# Patient Record
Sex: FEMALE | Race: BLACK OR AFRICAN AMERICAN | Employment: UNEMPLOYED | URBAN - METROPOLITAN AREA
[De-identification: names, ages, dates, MRNs, and addresses within clinical notes are randomized per-mention and may not be internally consistent; named-entity substitution may affect disease eponyms.]

---

## 2019-04-01 ENCOUNTER — HOSPITAL ENCOUNTER (EMERGENCY)
Age: 24
Discharge: OTHER INSTITUTION | End: 2019-04-01
Attending: EMERGENCY MEDICINE
Payer: MEDICAID

## 2019-04-01 VITALS
WEIGHT: 190 LBS | SYSTOLIC BLOOD PRESSURE: 100 MMHG | RESPIRATION RATE: 20 BRPM | TEMPERATURE: 97.8 F | DIASTOLIC BLOOD PRESSURE: 46 MMHG | HEIGHT: 65 IN | BODY MASS INDEX: 31.65 KG/M2 | HEART RATE: 68 BPM | OXYGEN SATURATION: 100 %

## 2019-04-01 DIAGNOSIS — R45.851 SUICIDAL IDEATIONS: ICD-10-CM

## 2019-04-01 DIAGNOSIS — F32.A DEPRESSION, UNSPECIFIED DEPRESSION TYPE: Primary | ICD-10-CM

## 2019-04-01 LAB
ACETAMINOPHEN LEVEL: <5 MCG/ML (ref 10–30)
ALBUMIN SERPL-MCNC: 3.9 G/DL (ref 3.5–5.2)
ALP BLD-CCNC: 70 U/L (ref 35–104)
ALT SERPL-CCNC: 8 U/L (ref 0–32)
AMPHETAMINE SCREEN, URINE: NOT DETECTED
ANION GAP SERPL CALCULATED.3IONS-SCNC: 12 MMOL/L (ref 7–16)
AST SERPL-CCNC: 13 U/L (ref 0–31)
BACTERIA: ABNORMAL /HPF
BARBITURATE SCREEN URINE: NOT DETECTED
BASOPHILS ABSOLUTE: 0.03 E9/L (ref 0–0.2)
BASOPHILS RELATIVE PERCENT: 0.5 % (ref 0–2)
BENZODIAZEPINE SCREEN, URINE: NOT DETECTED
BILIRUB SERPL-MCNC: 0.5 MG/DL (ref 0–1.2)
BILIRUBIN URINE: NEGATIVE
BLOOD, URINE: ABNORMAL
BUN BLDV-MCNC: 10 MG/DL (ref 6–20)
CALCIUM SERPL-MCNC: 8.9 MG/DL (ref 8.6–10.2)
CANNABINOID SCREEN URINE: POSITIVE
CHLORIDE BLD-SCNC: 104 MMOL/L (ref 98–107)
CLARITY: CLEAR
CO2: 23 MMOL/L (ref 22–29)
COCAINE METABOLITE SCREEN URINE: NOT DETECTED
COLOR: YELLOW
CREAT SERPL-MCNC: 0.8 MG/DL (ref 0.5–1)
EKG ATRIAL RATE: 77 BPM
EKG P AXIS: 59 DEGREES
EKG P-R INTERVAL: 172 MS
EKG Q-T INTERVAL: 386 MS
EKG QRS DURATION: 80 MS
EKG QTC CALCULATION (BAZETT): 436 MS
EKG R AXIS: 78 DEGREES
EKG T AXIS: 42 DEGREES
EKG VENTRICULAR RATE: 77 BPM
EOSINOPHILS ABSOLUTE: 0.11 E9/L (ref 0.05–0.5)
EOSINOPHILS RELATIVE PERCENT: 1.8 % (ref 0–6)
EPITHELIAL CELLS, UA: ABNORMAL /HPF
ETHANOL: <10 MG/DL (ref 0–0.08)
GFR AFRICAN AMERICAN: >60
GFR NON-AFRICAN AMERICAN: >60 ML/MIN/1.73
GLUCOSE BLD-MCNC: 101 MG/DL (ref 74–99)
GLUCOSE URINE: NEGATIVE MG/DL
HCG, URINE, POC: NEGATIVE
HCT VFR BLD CALC: 40.7 % (ref 34–48)
HEMOGLOBIN: 12.7 G/DL (ref 11.5–15.5)
IMMATURE GRANULOCYTES #: 0.02 E9/L
IMMATURE GRANULOCYTES %: 0.3 % (ref 0–5)
KETONES, URINE: NEGATIVE MG/DL
LEUKOCYTE ESTERASE, URINE: ABNORMAL
LYMPHOCYTES ABSOLUTE: 2.62 E9/L (ref 1.5–4)
LYMPHOCYTES RELATIVE PERCENT: 43.8 % (ref 20–42)
Lab: NORMAL
MCH RBC QN AUTO: 29.2 PG (ref 26–35)
MCHC RBC AUTO-ENTMCNC: 31.2 % (ref 32–34.5)
MCV RBC AUTO: 93.6 FL (ref 80–99.9)
METHADONE SCREEN, URINE: NOT DETECTED
MONOCYTES ABSOLUTE: 0.81 E9/L (ref 0.1–0.95)
MONOCYTES RELATIVE PERCENT: 13.5 % (ref 2–12)
NEGATIVE QC PASS/FAIL: NORMAL
NEUTROPHILS ABSOLUTE: 2.39 E9/L (ref 1.8–7.3)
NEUTROPHILS RELATIVE PERCENT: 40.1 % (ref 43–80)
NITRITE, URINE: NEGATIVE
OPIATE SCREEN URINE: NOT DETECTED
PDW BLD-RTO: 12.3 FL (ref 11.5–15)
PH UA: 6 (ref 5–9)
PHENCYCLIDINE SCREEN URINE: NOT DETECTED
PLATELET # BLD: 245 E9/L (ref 130–450)
PMV BLD AUTO: 11.3 FL (ref 7–12)
POSITIVE QC PASS/FAIL: NORMAL
POTASSIUM SERPL-SCNC: 3.9 MMOL/L (ref 3.5–5)
PROPOXYPHENE SCREEN: NOT DETECTED
PROTEIN UA: ABNORMAL MG/DL
RBC # BLD: 4.35 E12/L (ref 3.5–5.5)
RBC UA: ABNORMAL /HPF (ref 0–2)
SALICYLATE, SERUM: <0.3 MG/DL (ref 0–30)
SODIUM BLD-SCNC: 139 MMOL/L (ref 132–146)
SPECIFIC GRAVITY UA: 1.02 (ref 1–1.03)
TOTAL PROTEIN: 6.7 G/DL (ref 6.4–8.3)
TRICYCLIC ANTIDEPRESSANTS SCREEN SERUM: NEGATIVE NG/ML
TSH SERPL DL<=0.05 MIU/L-ACNC: 2.85 UIU/ML (ref 0.27–4.2)
UROBILINOGEN, URINE: 1 E.U./DL
WBC # BLD: 6 E9/L (ref 4.5–11.5)
WBC UA: ABNORMAL /HPF (ref 0–5)

## 2019-04-01 PROCEDURE — 80307 DRUG TEST PRSMV CHEM ANLYZR: CPT

## 2019-04-01 PROCEDURE — 93005 ELECTROCARDIOGRAM TRACING: CPT | Performed by: EMERGENCY MEDICINE

## 2019-04-01 PROCEDURE — G0480 DRUG TEST DEF 1-7 CLASSES: HCPCS

## 2019-04-01 PROCEDURE — 80053 COMPREHEN METABOLIC PANEL: CPT

## 2019-04-01 PROCEDURE — 84443 ASSAY THYROID STIM HORMONE: CPT

## 2019-04-01 PROCEDURE — 85025 COMPLETE CBC W/AUTO DIFF WBC: CPT

## 2019-04-01 PROCEDURE — 36415 COLL VENOUS BLD VENIPUNCTURE: CPT

## 2019-04-01 PROCEDURE — 81001 URINALYSIS AUTO W/SCOPE: CPT

## 2019-04-01 PROCEDURE — 99285 EMERGENCY DEPT VISIT HI MDM: CPT

## 2019-04-01 PROCEDURE — 6370000000 HC RX 637 (ALT 250 FOR IP): Performed by: NURSE PRACTITIONER

## 2019-04-01 RX ORDER — LORAZEPAM 1 MG/1
1 TABLET ORAL ONCE
Status: COMPLETED | OUTPATIENT
Start: 2019-04-01 | End: 2019-04-01

## 2019-04-01 RX ADMIN — LORAZEPAM 1 MG: 1 TABLET ORAL at 03:16

## 2019-04-01 ASSESSMENT — PATIENT HEALTH QUESTIONNAIRE - PHQ9: SUM OF ALL RESPONSES TO PHQ QUESTIONS 1-9: 15

## 2019-04-01 NOTE — ED NOTES
2 belongings bags with patient belongings provided to EMS. Patient acknowledges all belongings.      Chivo Mcfarland RN  04/01/19 [id number]

## 2019-04-01 NOTE — ED NOTES
MICHAEL PLACED PHONE CALL TO [employer] [phone] AND SPOKE WITH REP. FLORES.    [facility] AMBULANCE ETA 2:45 PM    AMBULANCE PACKET COMPLETE.     AUTHORIZATION #[id number]

## 2019-04-01 NOTE — ED NOTES
Completed patient assessment. Patient admits to suicidal ideation - admits to thoughts of wanting to OD. Patient denies HI. Patient is a 27year old, female presenting to ED for suicidal ideation. Patient reports that her mother called the PD because pt locked herself in her room and pt mother was concerned that she would attempt suicide. Patient reports that she has been feeling this way for a year with no known trigger - patient did report to the CNP that she recently lost her job and had to move back in with her mother. Patient reports a mental health hx of depression & anxiety, not actively in mental health treatment, and pt denies hx of psychiatric hospitalization. 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 Patient reports that she \"trys not to sleep\". Patient reports poor appetite. Patient reports increased feelings of hopelessness/helplessness. Patient admits to hx of suicide attempts - most recent in July 2018 for an OD. Patient reports hx of self injurious behaviors - last cut at the age of 17. Patient admits to occasional marijuana use. Patient cooperative, oriented x 4, depressed mood, flat affect, clear thought process/speech pattern. Patient admits to auditory hallucinations - unable to verbalize what she experiences. Patient denies visual hallucinations. Patient was pink slipped for safety. SW will pursue inpatient admission for safety/stabilization.

## 2019-04-01 NOTE — ED NOTES
RECEIVED PHONE CALL FROM [facility] . Katarina Eden. PATIENT IS ACCEPTED TO [facility] BY DR. Nasrin Waters. PATIENT TO GO TO THE ADULT [facility]    N2N: [phone]    SW TO ARRANGE TRANSPORT.

## 2019-04-01 NOTE — ED PROVIDER NOTES
home medications have been reviewed. Allergies: Patient has no known allergies. -------------------------------------------------- RESULTS -------------------------------------------------  All laboratory and imaging studies were reviewed by myself.     LABS:  Results for orders placed or performed during the hospital encounter of 04/01/19   Urine Drug Screen   Result Value Ref Range    Amphetamine Screen, Urine NOT DETECTED Negative <1000 ng/mL    Barbiturate Screen, Ur NOT DETECTED Negative < 200 ng/mL    Benzodiazepine Screen, Urine NOT DETECTED Negative < 200 ng/mL    Cannabinoid Scrn, Ur POSITIVE (A) Negative < 50ng/mL    Cocaine Metabolite Screen, Urine NOT DETECTED Negative < 300 ng/mL    Opiate Scrn, Ur NOT DETECTED Negative < 300ng/mL    PCP Screen, Urine NOT DETECTED Negative < 25 ng/mL    Methadone Screen, Urine NOT DETECTED Negative <300 ng/mL    Propoxyphene Scrn, Ur NOT DETECTED Negative <300 ng/mL   Serum Drug Screen   Result Value Ref Range    Ethanol Lvl <10 mg/dL    Acetaminophen Level <5.0 (L) 10.0 - 26.5 mcg/mL    Salicylate, Serum <0.6 0.0 - 30.0 mg/dL    TCA Scrn NEGATIVE Cutoff:300 ng/mL   CBC Auto Differential   Result Value Ref Range    WBC 6.0 4.5 - 11.5 E9/L    RBC 4.35 3.50 - 5.50 E12/L    Hemoglobin 12.7 11.5 - 15.5 g/dL    Hematocrit 40.7 34.0 - 48.0 %    MCV 93.6 80.0 - 99.9 fL    MCH 29.2 26.0 - 35.0 pg    MCHC 31.2 (L) 32.0 - 34.5 %    RDW 12.3 11.5 - 15.0 fL    Platelets 850 258 - 007 E9/L    MPV 11.3 7.0 - 12.0 fL    Neutrophils % 40.1 (L) 43.0 - 80.0 %    Immature Granulocytes % 0.3 0.0 - 5.0 %    Lymphocytes % 43.8 (H) 20.0 - 42.0 %    Monocytes % 13.5 (H) 2.0 - 12.0 %    Eosinophils % 1.8 0.0 - 6.0 %    Basophils % 0.5 0.0 - 2.0 %    Neutrophils # 2.39 1.80 - 7.30 E9/L    Immature Granulocytes # 0.02 E9/L    Lymphocytes # 2.62 1.50 - 4.00 E9/L    Monocytes # 0.81 0.10 - 0.95 E9/L    Eosinophils # 0.11 0.05 - 0.50 E9/L    Basophils # 0.03 0.00 - 0.20 E9/L   Comprehensive ---------------------------------------------------PHYSICAL EXAM--------------------------------------      Constitutional/General: Alert and oriented x3, well appearing, non toxic in NAD  Head: Normocephalic, atraumatic  Eyes: PERRL, EOMI  Mouth: Oropharynx clear, handling secretions, no trismus  Neck: Supple, full ROM, non tender to palpation in the midline, no stridor, no crepitus, no meningeal signs  Pulmonary: Lungs clear to auscultation bilaterally, no wheezes, rales, or rhonchi. Not in respiratory distress  Cardiovascular:  Regular rate and rhythm, no murmurs, gallops, or rubs. 2+ distal pulses  Abdomen: Soft, non tender, non distended, +BS, no rebound, guarding, or rigidity. No pulsatile masses appreciated  Extremities: Moves all extremities x 4. Warm and well perfused, no clubbing, cyanosis, or edema. Capillary refill <3 seconds  Skin: warm and dry without rash  Neurologic: GCS 15, CN 2-12 grossly intact, no focal deficits, symmetric strength 5/5 in the upper and lower extremities bilaterally  Psych: Depressed Affect,Patient presents today with worsening depression and active suicidal thoughts. Patient reports that she has felt this way for over one year. She reports that she overdosed on meds in July but never was seen for this and does not have a counselor or psychiatrist and does not take any medications. Patient reports that she recently lost her job due to her depression and patient just moved back home with her mother to live. Patient crying on my assessment and patient just stating that she no longer once to live anymore. Patient will not state reasoning why just reports multiple stressors. Patient with very flat affect. She denies drug or alcohol abuse, she also denies any homicidal ideations or hallucinations. Patient reports plan would be any way she can even by medications or drugs.       ------------------------------------------ PROGRESS NOTES ------------------------------------------     Medical decision making:  Plan will be to obtain labs will consult . Patient is a pink slip. Suicide precautions initiated. Labs resulted urinalysis with trace leukocytes, urine pregnancy negative, CBC negative, urine drug screen positive for THC, serum drug screen negative, chemistry panel negative, TSH normal.  patient is medically cleared. Patient is pink slipped. Awaiting  evaluation    Consultations:   Social work     Re-Evaluations:        Counseling: The emergency provider has spoken with thepatient and discussed todays results, in addition to providing specific details for the plan of care and counseling regarding the diagnosis and prognosis. Questions are answered at this time and they are agreeable with the plan.         --------------------------------- IMPRESSION AND DISPOSITION ---------------------------------    IMPRESSION  1. Depression, unspecified depression type    2.  Suicidal ideations        DISPOSITION  Disposition: as per consultation   Patient condition is stable           HARPAL Voss CNP  04/01/19 827 [facility], APRN - CNP  04/01/19 [id number]

## 2019-04-06 LAB — CANNABINOIDS CONF, URINE: >500 NG/ML

## 2020-06-11 ENCOUNTER — HOSPITAL ENCOUNTER (OUTPATIENT)
Age: 25
Discharge: HOME | End: 2020-06-11
Payer: MEDICAID

## 2020-06-11 LAB
Lab: NORMAL
REPORT: NORMAL
THIS TEST SENT TO: NORMAL

## 2020-09-25 ENCOUNTER — HOSPITAL ENCOUNTER (OUTPATIENT)
Age: 25
Discharge: HOME | End: 2020-09-25
Attending: OBSTETRICS & GYNECOLOGY | Admitting: OBSTETRICS & GYNECOLOGY
Payer: MEDICAID

## 2020-09-25 VITALS
RESPIRATION RATE: 16 BRPM | HEART RATE: 102 BPM | HEIGHT: 65 IN | BODY MASS INDEX: 32.99 KG/M2 | SYSTOLIC BLOOD PRESSURE: 117 MMHG | TEMPERATURE: 98.5 F | WEIGHT: 198 LBS | DIASTOLIC BLOOD PRESSURE: 73 MMHG

## 2020-09-25 PROBLEM — O16.3 ELEVATED BLOOD PRESSURE AFFECTING PREGNANCY IN THIRD TRIMESTER, ANTEPARTUM: Status: ACTIVE | Noted: 2020-09-25

## 2020-09-25 LAB
ALBUMIN SERPL-MCNC: 3.3 G/DL (ref 3.5–5.2)
ALP BLD-CCNC: 204 U/L (ref 35–104)
ALT SERPL-CCNC: 18 U/L (ref 0–32)
ANION GAP SERPL CALCULATED.3IONS-SCNC: 9 MMOL/L (ref 7–16)
APTT: 26.9 SEC (ref 24.5–35.1)
AST SERPL-CCNC: 21 U/L (ref 0–31)
BACTERIA: ABNORMAL /HPF
BASOPHILS ABSOLUTE: 0.02 E9/L (ref 0–0.2)
BASOPHILS RELATIVE PERCENT: 0.2 % (ref 0–2)
BILIRUB SERPL-MCNC: 0.5 MG/DL (ref 0–1.2)
BILIRUBIN URINE: NEGATIVE
BLOOD, URINE: NEGATIVE
BUN BLDV-MCNC: 4 MG/DL (ref 6–20)
CALCIUM SERPL-MCNC: 8.9 MG/DL (ref 8.6–10.2)
CHLORIDE BLD-SCNC: 104 MMOL/L (ref 98–107)
CLARITY: CLEAR
CO2: 20 MMOL/L (ref 22–29)
COLOR: YELLOW
CREAT SERPL-MCNC: 0.6 MG/DL (ref 0.5–1)
EOSINOPHILS ABSOLUTE: 0.13 E9/L (ref 0.05–0.5)
EOSINOPHILS RELATIVE PERCENT: 1.4 % (ref 0–6)
EPITHELIAL CELLS, UA: ABNORMAL /HPF
FIBRINOGEN: 606 MG/DL (ref 225–540)
GFR AFRICAN AMERICAN: >60
GFR NON-AFRICAN AMERICAN: >60 ML/MIN/1.73
GLUCOSE BLD-MCNC: 98 MG/DL (ref 74–99)
GLUCOSE URINE: NEGATIVE MG/DL
HCT VFR BLD CALC: 34.3 % (ref 34–48)
HEMOGLOBIN: 10.9 G/DL (ref 11.5–15.5)
IMMATURE GRANULOCYTES #: 0.11 E9/L
IMMATURE GRANULOCYTES %: 1.2 % (ref 0–5)
INR BLD: 0.9
KETONES, URINE: NEGATIVE MG/DL
LEUKOCYTE ESTERASE, URINE: ABNORMAL
LYMPHOCYTES ABSOLUTE: 1.75 E9/L (ref 1.5–4)
LYMPHOCYTES RELATIVE PERCENT: 19.4 % (ref 20–42)
MCH RBC QN AUTO: 29.5 PG (ref 26–35)
MCHC RBC AUTO-ENTMCNC: 31.8 % (ref 32–34.5)
MCV RBC AUTO: 93 FL (ref 80–99.9)
MONOCYTES ABSOLUTE: 1.13 E9/L (ref 0.1–0.95)
MONOCYTES RELATIVE PERCENT: 12.6 % (ref 2–12)
NEUTROPHILS ABSOLUTE: 5.86 E9/L (ref 1.8–7.3)
NEUTROPHILS RELATIVE PERCENT: 65.2 % (ref 43–80)
NITRITE, URINE: NEGATIVE
PDW BLD-RTO: 13.2 FL (ref 11.5–15)
PH UA: 7 (ref 5–9)
PLATELET # BLD: 149 E9/L (ref 130–450)
PMV BLD AUTO: 13.3 FL (ref 7–12)
POTASSIUM SERPL-SCNC: 3.9 MMOL/L (ref 3.5–5)
PROTEIN UA: NEGATIVE MG/DL
PROTHROMBIN TIME: 10.7 SEC (ref 9.3–12.4)
RBC # BLD: 3.69 E12/L (ref 3.5–5.5)
RBC UA: ABNORMAL /HPF (ref 0–2)
SODIUM BLD-SCNC: 133 MMOL/L (ref 132–146)
SPECIFIC GRAVITY UA: <=1.005 (ref 1–1.03)
TOTAL PROTEIN: 6.1 G/DL (ref 6.4–8.3)
URIC ACID, SERUM: 5.6 MG/DL (ref 2.4–5.7)
UROBILINOGEN, URINE: 0.2 E.U./DL
WBC # BLD: 9 E9/L (ref 4.5–11.5)
WBC UA: ABNORMAL /HPF (ref 0–5)

## 2020-09-25 PROCEDURE — 80053 COMPREHEN METABOLIC PANEL: CPT

## 2020-09-25 PROCEDURE — 85610 PROTHROMBIN TIME: CPT

## 2020-09-25 PROCEDURE — 85384 FIBRINOGEN ACTIVITY: CPT

## 2020-09-25 PROCEDURE — 99214 OFFICE O/P EST MOD 30 MIN: CPT | Performed by: MIDWIFE

## 2020-09-25 PROCEDURE — 84550 ASSAY OF BLOOD/URIC ACID: CPT

## 2020-09-25 PROCEDURE — 85730 THROMBOPLASTIN TIME PARTIAL: CPT

## 2020-09-25 PROCEDURE — 81001 URINALYSIS AUTO W/SCOPE: CPT

## 2020-09-25 PROCEDURE — 36415 COLL VENOUS BLD VENIPUNCTURE: CPT

## 2020-09-25 PROCEDURE — 85025 COMPLETE CBC W/AUTO DIFF WBC: CPT

## 2020-09-25 PROCEDURE — 99202 OFFICE O/P NEW SF 15 MIN: CPT

## 2020-09-25 NOTE — PROGRESS NOTES
presents to L&D from Dr. Alva Colon office with a written script for [facility] labs and BP monitoring. Patient denies VB, and LOF. +FM. Patient states that Dr. Scotty Gordon said she is having ctx. Patient is on EFM in RR2 and VSS.

## 2020-09-25 NOTE — H&P
Department of Obstetrics and Gynecology   Obstetrics History and Physical      CHIEF COMPLAINT:  elevated blood pressure    HISTORY OF PRESENT ILLNESS:      The patient is a 23 y.o.  at 39 weeks 6 days  Patient presents with a chief complaint as above and is being admitted from office for elevated BP. Sent with orders for [facility] labs. Pt denies H/A , visual disturbances or epigastric pain at this time. Pt states she was mukesh last evening, NO CTX/LOF/VB at this time. Reports + FM. Denies complications with pregnancy. PT states she had a previous history of HTN resulting from family issues, that was not treated with medications and resolved. Admits to [facility] use during pregnancy    DATES:    No LMP recorded. Patient is pregnant. Estimated Date of Delivery: 20    PRENATAL CARE:    Provider:  Dalila Desir         PAST OB HISTORY        Depression:  Yes       Post-partum depression:  No      Diabetes:  No      Gestational Diabetes:  No      Thyroid Disease:  No      Chronic HTN:  No      Gestation HTN:  No      Pre-eclampsia:  No      Seizure disorder:  No      Asthma:  No      Clotting disorder:  No      :  No      Tubal ligation:  No      D & C:  No      Cerclage:  No      LEEP:  No      Myomectomy:  No    OB History    Para Term  AB Living   1             SAB TAB Ectopic Molar Multiple Live Births                    # Outcome Date GA Lbr Paul/2nd Weight Sex Delivery Anes PTL Lv   1 Current                    Past Medical History:        Diagnosis Date    Depression     Hypertension      Past Surgical History:    History reviewed. No pertinent surgical history. Social History:    Denies smoking, drugs or alcohol use. Admits to [facility] use during pregnancy  Family History:   History reviewed. No pertinent family history.   Medications Prior to Admission:  Medications Prior to Admission: Prenatal Multivit-Min-Fe-FA (PRE-TAMIA PO), Take by mouth  vitamin D (ERGOCALCIFEROL) 88143 UNITS CAPS capsule, Take 1 capsule by mouth once a week for 8 doses. sertraline (ZOLOFT) 50 MG tablet, Take 50 mg by mouth daily. QUEtiapine (SEROQUEL) 100 MG tablet, Take 100 mg by mouth daily. Allergies:  Bee venom        PHYSICAL EXAM:    VITALS:  /82   Pulse 102   Temp 98.5 °F (36.9 °C) (Oral)   Resp 16   Ht 5' 5\" (1.651 m)   Wt 198 lb (89.8 kg)   BMI 32.95 kg/m²       General appearance:  awake, alert, cooperative, no apparent distress, and appears stated age  Neurologic:  Awake, alert, oriented to name, place and time.     Lungs:  No increased work of breathing, good air exchange, clear to auscultation bilaterally, no crackles or wheezing  Heart:  Normal apical impulse, regular rate and rhythm,  Abdomen:  Gravid, soft, nontender  Fetal heart rate:  Baseline Heart Rate 135, accelerations:  present  long term variability:  moderate  decelerations:  absent  Pelvis:  Deferred  Contraction frequency:  0 minutes  Membranes:  Intact      ASSESSMENT AND PLAN:  PT sent with orders from Dr. Beau Jones office  [facility] labs  [facility]    [facility]

## 2020-10-02 ENCOUNTER — HOSPITAL ENCOUNTER (INPATIENT)
Age: 25
LOS: 4 days | Discharge: HOME | DRG: 540 | End: 2020-10-06
Attending: OBSTETRICS & GYNECOLOGY | Admitting: OBSTETRICS & GYNECOLOGY
Payer: MEDICAID

## 2020-10-02 ENCOUNTER — ANESTHESIA EVENT (OUTPATIENT)
Dept: LABOR AND DELIVERY | Age: 25
DRG: 540 | End: 2020-10-02
Payer: MEDICAID

## 2020-10-02 ENCOUNTER — ANESTHESIA (OUTPATIENT)
Dept: LABOR AND DELIVERY | Age: 25
DRG: 540 | End: 2020-10-02
Payer: MEDICAID

## 2020-10-02 PROBLEM — Z34.90 NORMAL PREGNANCY, ANTEPARTUM: Status: ACTIVE | Noted: 2020-10-02

## 2020-10-02 LAB
ABO/RH: NORMAL
AMPHETAMINE SCREEN, URINE: NOT DETECTED
ANTIBODY SCREEN: NORMAL
BARBITURATE SCREEN URINE: NOT DETECTED
BENZODIAZEPINE SCREEN, URINE: NOT DETECTED
CANNABINOID SCREEN URINE: POSITIVE
COCAINE METABOLITE SCREEN URINE: NOT DETECTED
FENTANYL SCREEN, URINE: NOT DETECTED
HCT VFR BLD CALC: 35.5 % (ref 34–48)
HEMOGLOBIN: 11.3 G/DL (ref 11.5–15.5)
Lab: ABNORMAL
MCH RBC QN AUTO: 29.2 PG (ref 26–35)
MCHC RBC AUTO-ENTMCNC: 31.8 % (ref 32–34.5)
MCV RBC AUTO: 91.7 FL (ref 80–99.9)
METHADONE SCREEN, URINE: NOT DETECTED
OPIATE SCREEN URINE: NOT DETECTED
OXYCODONE URINE: NOT DETECTED
PDW BLD-RTO: 13.7 FL (ref 11.5–15)
PHENCYCLIDINE SCREEN URINE: NOT DETECTED
PLATELET # BLD: 175 E9/L (ref 130–450)
PMV BLD AUTO: 13.8 FL (ref 7–12)
RBC # BLD: 3.87 E12/L (ref 3.5–5.5)
WBC # BLD: 10.5 E9/L (ref 4.5–11.5)

## 2020-10-02 PROCEDURE — 4A1HXCZ MONITORING OF PRODUCTS OF CONCEPTION, CARDIAC RATE, EXTERNAL APPROACH: ICD-10-PCS | Performed by: OBSTETRICS & GYNECOLOGY

## 2020-10-02 PROCEDURE — 85027 COMPLETE CBC AUTOMATED: CPT

## 2020-10-02 PROCEDURE — 6360000002 HC RX W HCPCS: Performed by: OBSTETRICS & GYNECOLOGY

## 2020-10-02 PROCEDURE — 36415 COLL VENOUS BLD VENIPUNCTURE: CPT

## 2020-10-02 PROCEDURE — 1220000001 HC SEMI PRIVATE L&D R&B

## 2020-10-02 PROCEDURE — G0480 DRUG TEST DEF 1-7 CLASSES: HCPCS

## 2020-10-02 PROCEDURE — 86900 BLOOD TYPING SEROLOGIC ABO: CPT

## 2020-10-02 PROCEDURE — 86901 BLOOD TYPING SEROLOGIC RH(D): CPT

## 2020-10-02 PROCEDURE — 86850 RBC ANTIBODY SCREEN: CPT

## 2020-10-02 PROCEDURE — 2580000003 HC RX 258: Performed by: OBSTETRICS & GYNECOLOGY

## 2020-10-02 PROCEDURE — 99233 SBSQ HOSP IP/OBS HIGH 50: CPT | Performed by: PHYSICIAN ASSISTANT

## 2020-10-02 PROCEDURE — 80307 DRUG TEST PRSMV CHEM ANLYZR: CPT

## 2020-10-02 RX ORDER — NALOXONE HYDROCHLORIDE 0.4 MG/ML
0.4 INJECTION, SOLUTION INTRAMUSCULAR; INTRAVENOUS; SUBCUTANEOUS PRN
Status: DISCONTINUED | OUTPATIENT
Start: 2020-10-02 | End: 2020-10-03

## 2020-10-02 RX ORDER — ONDANSETRON 2 MG/ML
4 INJECTION INTRAMUSCULAR; INTRAVENOUS EVERY 6 HOURS PRN
Status: DISCONTINUED | OUTPATIENT
Start: 2020-10-02 | End: 2020-10-03

## 2020-10-02 RX ORDER — LIDOCAINE HYDROCHLORIDE 10 MG/ML
INJECTION, SOLUTION INFILTRATION; PERINEURAL
Status: DISPENSED
Start: 2020-10-02 | End: 2020-10-03

## 2020-10-02 RX ORDER — NALBUPHINE HCL 10 MG/ML
5 AMPUL (ML) INJECTION EVERY 4 HOURS PRN
Status: DISCONTINUED | OUTPATIENT
Start: 2020-10-02 | End: 2020-10-02 | Stop reason: RX

## 2020-10-02 RX ORDER — SODIUM CHLORIDE, SODIUM LACTATE, POTASSIUM CHLORIDE, CALCIUM CHLORIDE 600; 310; 30; 20 MG/100ML; MG/100ML; MG/100ML; MG/100ML
INJECTION, SOLUTION INTRAVENOUS CONTINUOUS
Status: DISCONTINUED | OUTPATIENT
Start: 2020-10-02 | End: 2020-10-06 | Stop reason: HOSPADM

## 2020-10-02 RX ORDER — ACETAMINOPHEN 650 MG
TABLET, EXTENDED RELEASE ORAL
Status: DISPENSED
Start: 2020-10-02 | End: 2020-10-03

## 2020-10-02 RX ORDER — EPHEDRINE SULFATE 50 MG/ML
5 INJECTION, SOLUTION INTRAVENOUS PRN
Status: DISCONTINUED | OUTPATIENT
Start: 2020-10-02 | End: 2020-10-03

## 2020-10-02 RX ADMIN — SODIUM CHLORIDE, POTASSIUM CHLORIDE, SODIUM LACTATE AND CALCIUM CHLORIDE: 600; 310; 30; 20 INJECTION, SOLUTION INTRAVENOUS at 23:15

## 2020-10-02 RX ADMIN — BUTORPHANOL TARTRATE 1 MG: 2 INJECTION, SOLUTION INTRAMUSCULAR; INTRAVENOUS at 19:13

## 2020-10-02 RX ADMIN — BUTORPHANOL TARTRATE 1 MG: 2 INJECTION, SOLUTION INTRAMUSCULAR; INTRAVENOUS at 19:12

## 2020-10-02 RX ADMIN — SODIUM CHLORIDE, POTASSIUM CHLORIDE, SODIUM LACTATE AND CALCIUM CHLORIDE: 600; 310; 30; 20 INJECTION, SOLUTION INTRAVENOUS at 17:40

## 2020-10-02 ASSESSMENT — PAIN SCALES - GENERAL
PAINLEVEL_OUTOF10: 5
PAINLEVEL_OUTOF10: 5

## 2020-10-02 ASSESSMENT — PAIN DESCRIPTION - DESCRIPTORS: DESCRIPTORS: CRAMPING

## 2020-10-02 ASSESSMENT — LIFESTYLE VARIABLES: SMOKING_STATUS: 1

## 2020-10-02 NOTE — PROGRESS NOTES
40w6d presents to labor and delivery from Dr Dago Bauer office for rule out labor. Pt states contractions started at 0930 this morning. Denies any leaking of fluid or bleeding and states good fetal movement. Placed on EFM. Dr Cyrus Mathew at nurses station informed of pt's arrival, states pt was 2cm in the office today, monitor pt for 2 hours, recheck cervix and update.

## 2020-10-02 NOTE — PROGRESS NOTES
Dr joshi paged and returned call. Informed of AROM, SVE, fhr tracing/ctx pattern, and pt did refuse IUPC placement. No new orders.

## 2020-10-02 NOTE — H&P
Department of Obstetrics and Gynecology  Physician Assistant Obstetrics History and Physical      HISTORY OF PRESENT ILLNESS:      The patient is a 23 y.o.  1 parity 0 at 36 weeks' 6 days' gestation presents to L&D from ob office to rule out labor. Contractions began at 09:30 am.    Current obstetric history is significant for:  Gestational hypertension  History of marijuana abuse    Estimated Due Date:  2020  Contractions: Yes  Leaking of fluid: No  Bleeding:  No  Perceived fetal movement: Good    Group B Strep: negative      PAST OB HISTORY: not applicable          Past Medical History:    Diagnosis Date    Depression     Hypertension         Past Surgical History:    History reviewed. No pertinent surgical history. Social History:    Reports that she has quit smoking. Her smoking use included cigarettes. She has a 0.20 pack-year smoking history. She has never used smokeless tobacco. She reports current drug use. She reports that she does not drink alcohol. Medications Prior to Admission:  Medications Prior to Admission: Prenatal Multivit-Min-Fe-FA (PRE-TAMIA PO), Take by mouth  vitamin D (ERGOCALCIFEROL) 16274 UNITS CAPS capsule, Take 1 capsule by mouth once a week for 8 doses. [DISCONTINUED] sertraline (ZOLOFT) 50 MG tablet, Take 50 mg by mouth daily. [DISCONTINUED] QUEtiapine (SEROQUEL) 100 MG tablet, Take 100 mg by mouth daily. Allergies:  Bee venom    ROS:  Const: No fever, chills, night sweats, no recent unexplained weight gain/loss  HEENT: No blurred vision, double vision; no ear problems; no sore throat, congestion; no running nose. Resp: No cough, no sputum, no pleuritic chest pain, no sob  Cardio: No chest pain, no exertional dyspnea, no PND, no orthopnea, no palpitation, no leg swelling. GI: No dysphagia, no reflux; no abdominal pain, no n/v; no c/d.  No hematochezia    : No dysuria, no frequency, hesitancy; no hematuria  MSK: no joint pain, no myalgia, no change in ROM  Neuro: no focal weakness in extremities, no slurred speech, no double vision, no numbness or tingling in extremities  Endo: no heat/cold intolerance, no polyphagia, polydipsia or polyuria  Hem: no increased bleeding, no bruising, no lymphadenopathy  Skin: no skin changes  Psych: no depressed mood, no suicidal ideation  Pertinent +'s & -'s addressed in HPI    PHYSICAL EXAM:  /86   Pulse 92   Temp 98.5 °F (36.9 °C) (Oral)   Resp 18   Ht 5' 5\" (1.651 m)   Wt 202 lb 6.4 oz (91.8 kg)   BMI 33.68 kg/m²     General appearance: Comfortable  Lungs:  CTA bilaterally, good excursion  Heart:  Regular Rate & Rhythm, no murmur noted  Abdomen:  Soft, non-tender, gravid  Uterus: soft, nontender  Fetal heart rate:  Baseline Heart Rate 130; variability: moderate;  accelerations: present;  decelerations: absent  Cervix:  DILATION: 2 cm with bulging bag  EFFACEMENT:  90%  STATION:  -2 cm  Presentation: vertex  Contraction frequency:  occasional  Membranes:  Intact  Extremities: (-) edema         ASSESSMENT   24 yo primip IUP at 40 weeks' 6 days' gestation    R/o labor         Plan: Orders per Dr. Cyrus Mathew:  EFM  Monitor for 2 hours  Recheck cervix   Update with cervical exam        Electronically signed by Ann-Marie Diaz PA-C on 10/2/2020 at 3:23 PM

## 2020-10-02 NOTE — ANESTHESIA PRE PROCEDURE
Department of Anesthesiology  Preprocedure Note       Name:  Delicia Puckett   Age:  23 y.o.  :  1995                                          MRN:  [id number]         Date:  10/2/2020      Surgeon: * No surgeons listed *    Procedure: * No procedures listed *    Medications prior to admission:   Prior to Admission medications    Medication Sig Start Date End Date Taking? Authorizing Provider   Prenatal Multivit-Min-Fe-FA (PRE- PO) Take by mouth   Yes Historical Provider, MD   vitamin D (ERGOCALCIFEROL) 92033 UNITS CAPS capsule Take 1 capsule by mouth once a week for 8 doses. 1/29/15 3/20/15  Christian Alston MD       Current medications:    Current Facility-Administered Medications   Medication Dose Route Frequency Provider Last Rate Last Dose    lactated ringers infusion   Intravenous Continuous Micheal Buchanan MD        ondansetron [facility] injection 4 mg  4 mg Intravenous Q6H PRN Micheal Buchanan MD        butorphanol (STADOL) injection 1 mg  1 mg Intravenous Q3H PRN Micheal Buchanan MD        And    butorphanol (STADOL) injection 1 mg  1 mg Intramuscular Q3H PRN Micheal Buchanan MD           Allergies: Allergies   Allergen Reactions    Bee Venom Anaphylaxis       Problem List:    Patient Active Problem List   Diagnosis Code    Depression F32.9    Obesity (BMI 30.0-34. 9) E66.9    Headache R51.9    Vitamin D deficiency E55.9    Elevated blood pressure affecting pregnancy in third trimester, antepartum O16.3    40 weeks gestation of pregnancy Z3A.40    Uterine contractions during pregnancy O62.2    Normal pregnancy, antepartum Z34.90       Past Medical History:        Diagnosis Date    Depression     Hypertension        Past Surgical History:  History reviewed. No pertinent surgical history.     Social History:    Social History     Tobacco Use    Smoking status: Former Smoker     Packs/day: 0.20     Years: 1.00     Pack years: 0.20     Types: Cigarettes    Smokeless tobacco: Never Used Substance Use Topics    Alcohol use: No                                Counseling given: Not Answered      Vital Signs (Current):   Vitals:    10/02/20 1500 10/02/20 1508   BP:  129/86   Pulse:  92   Resp:  18   Temp:  36.9 °C (98.5 °F)   TempSrc:  Oral   Weight: 202 lb 6.4 oz (91.8 kg)    Height: 5' 5\" (1.651 m)                                               BP Readings from Last 3 Encounters:   10/02/20 129/86   09/25/20 117/73   04/01/19 (!) 100/46       NPO Status:                                                                                 BMI:   Wt Readings from Last 3 Encounters:   10/02/20 202 lb 6.4 oz (91.8 kg)   09/25/20 198 lb (89.8 kg)   04/01/19 190 lb (86.2 kg)     Body mass index is 33.68 kg/m². CBC:   Lab Results   Component Value Date    WBC 9.0 09/25/2020    RBC 3.69 09/25/2020    HGB 10.9 09/25/2020    HCT 34.3 09/25/2020    MCV 93.0 09/25/2020    RDW 13.2 09/25/2020     09/25/2020       CMP:   Lab Results   Component Value Date     09/25/2020    K 3.9 09/25/2020     09/25/2020    CO2 20 09/25/2020    BUN 4 09/25/2020    CREATININE 0.6 09/25/2020    GFRAA >60 09/25/2020    LABGLOM >60 09/25/2020    GLUCOSE 98 09/25/2020    PROT 6.1 09/25/2020    CALCIUM 8.9 09/25/2020    BILITOT 0.5 09/25/2020    ALKPHOS 204 09/25/2020    AST 21 09/25/2020    ALT 18 09/25/2020       POC Tests: No results for input(s): POCGLU, POCNA, POCK, POCCL, POCBUN, POCHEMO, POCHCT in the last 72 hours.     Coags:   Lab Results   Component Value Date    PROTIME 10.7 09/25/2020    INR 0.9 09/25/2020    APTT 26.9 09/25/2020       HCG (If Applicable): No results found for: PREGTESTUR, PREGSERUM, HCG, HCGQUANT     ABGs: No results found for: PHART, PO2ART, AIY2RRZ, HSH3PIP, BEART, H7HBTNKB     Type & Screen (If Applicable):  No results found for: LABABO, LABRH    Drug/Infectious Status (If Applicable):  No results found for: HIV, HEPCAB    COVID-19 Screening (If Applicable): No results found for: COVID19      Anesthesia Evaluation  Patient summary reviewed and Nursing notes reviewed no history of anesthetic complications:   Airway: Mallampati: II  TM distance: >3 FB   Neck ROM: full  Mouth opening: > = 3 FB Dental: normal exam         Pulmonary: breath sounds clear to auscultation  (+) current smoker          Patient did not smoke on day of surgery. ROS comment: Patient smokes 1/2 PPD x 5 yrs. Patient also smokes marijuana. Stated she quit marijunia in September. Cardiovascular:  Exercise tolerance: good (>4 METS),   (+) hypertension: mild,         Rhythm: regular  Rate: normal           Beta Blocker:  Not on Beta Blocker         Neuro/Psych:               GI/Hepatic/Renal:   (+) morbid obesity          Endo/Other:    (+) blood dyscrasia: anemia:., .                 Abdominal:           Vascular:                                        Anesthesia Plan      general, spinal and epidural     ASA 2             Anesthetic plan and risks discussed with patient and spouse. Plan discussed with attending.               CBC   Lab Results   Component Value Date    WBC 9.0 09/25/2020    RBC 3.69 09/25/2020    HGB 10.9 09/25/2020    HCT 34.3 09/25/2020    MCV 93.0 09/25/2020    RDW 13.2 09/25/2020     09/25/2020     CMP    Lab Results   Component Value Date     09/25/2020    K 3.9 09/25/2020     09/25/2020    CO2 20 09/25/2020    BUN 4 09/25/2020    CREATININE 0.6 09/25/2020    GFRAA >60 09/25/2020    LABGLOM >60 09/25/2020    GLUCOSE 98 09/25/2020    PROT 6.1 09/25/2020    CALCIUM 8.9 09/25/2020    BILITOT 0.5 09/25/2020    ALKPHOS 204 09/25/2020    AST 21 09/25/2020    ALT 18 09/25/2020     BMP    Lab Results   Component Value Date     09/25/2020    K 3.9 09/25/2020     09/25/2020    CO2 20 09/25/2020    BUN 4 09/25/2020    CREATININE 0.6 09/25/2020    CALCIUM 8.9 09/25/2020    GFRAA >60 09/25/2020    LABGLOM >60 09/25/2020    GLUCOSE 98 09/25/2020     POCGlucose  No

## 2020-10-02 NOTE — PROGRESS NOTES
Dr joshi returned call. Informed of unchanged sve, fhr tracing/ctx pattern. Orders received to admit, arom, iupc.

## 2020-10-03 VITALS — SYSTOLIC BLOOD PRESSURE: 129 MMHG | OXYGEN SATURATION: 97 % | DIASTOLIC BLOOD PRESSURE: 78 MMHG

## 2020-10-03 PROCEDURE — 2709999900 HC NON-CHARGEABLE SUPPLY: Performed by: OBSTETRICS & GYNECOLOGY

## 2020-10-03 PROCEDURE — 2780000010 HC IMPLANT OTHER: Performed by: OBSTETRICS & GYNECOLOGY

## 2020-10-03 PROCEDURE — 6360000002 HC RX W HCPCS: Performed by: ANESTHESIOLOGY

## 2020-10-03 PROCEDURE — 3609079900 HC CESAREAN SECTION: Performed by: OBSTETRICS & GYNECOLOGY

## 2020-10-03 PROCEDURE — 6370000000 HC RX 637 (ALT 250 FOR IP): Performed by: OBSTETRICS & GYNECOLOGY

## 2020-10-03 PROCEDURE — 3700000001 HC ADD 15 MINUTES (ANESTHESIA): Performed by: OBSTETRICS & GYNECOLOGY

## 2020-10-03 PROCEDURE — 2500000003 HC RX 250 WO HCPCS: Performed by: NURSE ANESTHETIST, CERTIFIED REGISTERED

## 2020-10-03 PROCEDURE — 88307 TISSUE EXAM BY PATHOLOGIST: CPT

## 2020-10-03 PROCEDURE — 6370000000 HC RX 637 (ALT 250 FOR IP)

## 2020-10-03 PROCEDURE — 2500000003 HC RX 250 WO HCPCS: Performed by: ANESTHESIOLOGY

## 2020-10-03 PROCEDURE — 3700000025 EPIDURAL BLOCK: Performed by: ANESTHESIOLOGY

## 2020-10-03 PROCEDURE — 3700000000 HC ANESTHESIA ATTENDED CARE: Performed by: OBSTETRICS & GYNECOLOGY

## 2020-10-03 PROCEDURE — 7100000001 HC PACU RECOVERY - ADDTL 15 MIN: Performed by: OBSTETRICS & GYNECOLOGY

## 2020-10-03 PROCEDURE — 1220000000 HC SEMI PRIVATE OB R&B

## 2020-10-03 PROCEDURE — 7100000000 HC PACU RECOVERY - FIRST 15 MIN: Performed by: OBSTETRICS & GYNECOLOGY

## 2020-10-03 PROCEDURE — 2580000003 HC RX 258: Performed by: OBSTETRICS & GYNECOLOGY

## 2020-10-03 PROCEDURE — 6360000002 HC RX W HCPCS

## 2020-10-03 PROCEDURE — 6360000002 HC RX W HCPCS: Performed by: NURSE ANESTHETIST, CERTIFIED REGISTERED

## 2020-10-03 RX ORDER — FERROUS SULFATE 325(65) MG
325 TABLET ORAL 2 TIMES DAILY WITH MEALS
Status: DISCONTINUED | OUTPATIENT
Start: 2020-10-03 | End: 2020-10-06 | Stop reason: HOSPADM

## 2020-10-03 RX ORDER — OXYCODONE HYDROCHLORIDE AND ACETAMINOPHEN 5; 325 MG/1; MG/1
1 TABLET ORAL EVERY 4 HOURS PRN
Status: DISCONTINUED | OUTPATIENT
Start: 2020-10-03 | End: 2020-10-06 | Stop reason: HOSPADM

## 2020-10-03 RX ORDER — OXYCODONE HYDROCHLORIDE AND ACETAMINOPHEN 5; 325 MG/1; MG/1
2 TABLET ORAL EVERY 4 HOURS PRN
Status: DISCONTINUED | OUTPATIENT
Start: 2020-10-03 | End: 2020-10-06 | Stop reason: HOSPADM

## 2020-10-03 RX ORDER — TERBUTALINE SULFATE 1 MG/ML
INJECTION, SOLUTION SUBCUTANEOUS
Status: COMPLETED
Start: 2020-10-03 | End: 2020-10-03

## 2020-10-03 RX ORDER — ONDANSETRON 2 MG/ML
INJECTION INTRAMUSCULAR; INTRAVENOUS PRN
Status: DISCONTINUED | OUTPATIENT
Start: 2020-10-03 | End: 2020-10-03 | Stop reason: SDUPTHER

## 2020-10-03 RX ORDER — SODIUM CHLORIDE 0.9 % (FLUSH) 0.9 %
10 SYRINGE (ML) INJECTION EVERY 12 HOURS SCHEDULED
Status: DISCONTINUED | OUTPATIENT
Start: 2020-10-03 | End: 2020-10-06 | Stop reason: HOSPADM

## 2020-10-03 RX ORDER — MEPERIDINE HYDROCHLORIDE 25 MG/ML
25 INJECTION INTRAMUSCULAR; INTRAVENOUS; SUBCUTANEOUS EVERY 4 HOURS PRN
Status: DISCONTINUED | OUTPATIENT
Start: 2020-10-03 | End: 2020-10-06 | Stop reason: HOSPADM

## 2020-10-03 RX ORDER — MODIFIED LANOLIN
OINTMENT (GRAM) TOPICAL
Status: DISCONTINUED | OUTPATIENT
Start: 2020-10-03 | End: 2020-10-06 | Stop reason: HOSPADM

## 2020-10-03 RX ORDER — DIPHENHYDRAMINE HYDROCHLORIDE 50 MG/ML
25 INJECTION INTRAMUSCULAR; INTRAVENOUS EVERY 6 HOURS PRN
Status: DISCONTINUED | OUTPATIENT
Start: 2020-10-03 | End: 2020-10-06 | Stop reason: HOSPADM

## 2020-10-03 RX ORDER — CARBOPROST TROMETHAMINE 250 UG/ML
250 INJECTION, SOLUTION INTRAMUSCULAR PRN
Status: DISCONTINUED | OUTPATIENT
Start: 2020-10-03 | End: 2020-10-06 | Stop reason: HOSPADM

## 2020-10-03 RX ORDER — DOCUSATE SODIUM 100 MG/1
100 CAPSULE, LIQUID FILLED ORAL 2 TIMES DAILY
Status: DISCONTINUED | OUTPATIENT
Start: 2020-10-03 | End: 2020-10-06 | Stop reason: HOSPADM

## 2020-10-03 RX ORDER — FENTANYL CITRATE 50 UG/ML
INJECTION, SOLUTION INTRAMUSCULAR; INTRAVENOUS PRN
Status: DISCONTINUED | OUTPATIENT
Start: 2020-10-03 | End: 2020-10-03 | Stop reason: SDUPTHER

## 2020-10-03 RX ORDER — NALOXONE HYDROCHLORIDE 0.4 MG/ML
0.4 INJECTION, SOLUTION INTRAMUSCULAR; INTRAVENOUS; SUBCUTANEOUS PRN
Status: DISCONTINUED | OUTPATIENT
Start: 2020-10-03 | End: 2020-10-06 | Stop reason: HOSPADM

## 2020-10-03 RX ORDER — TRISODIUM CITRATE DIHYDRATE AND CITRIC ACID MONOHYDRATE 500; 334 MG/5ML; MG/5ML
SOLUTION ORAL
Status: COMPLETED
Start: 2020-10-03 | End: 2020-10-03

## 2020-10-03 RX ORDER — SODIUM CHLORIDE, SODIUM LACTATE, POTASSIUM CHLORIDE, CALCIUM CHLORIDE 600; 310; 30; 20 MG/100ML; MG/100ML; MG/100ML; MG/100ML
INJECTION, SOLUTION INTRAVENOUS CONTINUOUS
Status: DISCONTINUED | OUTPATIENT
Start: 2020-10-03 | End: 2020-10-06 | Stop reason: HOSPADM

## 2020-10-03 RX ORDER — SODIUM CHLORIDE, SODIUM LACTATE, POTASSIUM CHLORIDE, CALCIUM CHLORIDE 600; 310; 30; 20 MG/100ML; MG/100ML; MG/100ML; MG/100ML
INJECTION, SOLUTION INTRAVENOUS CONTINUOUS
Status: CANCELLED | OUTPATIENT
Start: 2020-10-03

## 2020-10-03 RX ORDER — METHYLERGONOVINE MALEATE 0.2 MG/ML
INJECTION INTRAVENOUS PRN
Status: DISCONTINUED | OUTPATIENT
Start: 2020-10-03 | End: 2020-10-03 | Stop reason: SDUPTHER

## 2020-10-03 RX ORDER — OXYCODONE HYDROCHLORIDE 5 MG/1
10 TABLET ORAL EVERY 4 HOURS PRN
Status: DISCONTINUED | OUTPATIENT
Start: 2020-10-03 | End: 2020-10-04 | Stop reason: ALTCHOICE

## 2020-10-03 RX ORDER — METHYLERGONOVINE MALEATE 0.2 MG/ML
INJECTION INTRAVENOUS
Status: COMPLETED
Start: 2020-10-03 | End: 2020-10-03

## 2020-10-03 RX ORDER — NALBUPHINE HCL 10 MG/ML
5 AMPUL (ML) INJECTION EVERY 4 HOURS PRN
Status: DISCONTINUED | OUTPATIENT
Start: 2020-10-03 | End: 2020-10-06 | Stop reason: HOSPADM

## 2020-10-03 RX ORDER — METHYLERGONOVINE MALEATE 0.2 MG/ML
200 INJECTION INTRAVENOUS PRN
Status: DISCONTINUED | OUTPATIENT
Start: 2020-10-03 | End: 2020-10-06 | Stop reason: HOSPADM

## 2020-10-03 RX ORDER — TRISODIUM CITRATE DIHYDRATE AND CITRIC ACID MONOHYDRATE 500; 334 MG/5ML; MG/5ML
30 SOLUTION ORAL ONCE
Status: CANCELLED | OUTPATIENT
Start: 2020-10-03 | End: 2020-10-03

## 2020-10-03 RX ORDER — METHYLERGONOVINE MALEATE 0.2 MG/ML
INJECTION INTRAVENOUS
Status: DISPENSED
Start: 2020-10-03 | End: 2020-10-03

## 2020-10-03 RX ORDER — ACETAMINOPHEN 325 MG/1
325 TABLET ORAL EVERY 4 HOURS PRN
Status: DISCONTINUED | OUTPATIENT
Start: 2020-10-03 | End: 2020-10-06 | Stop reason: HOSPADM

## 2020-10-03 RX ORDER — OXYCODONE HYDROCHLORIDE 5 MG/1
5 TABLET ORAL EVERY 4 HOURS PRN
Status: DISCONTINUED | OUTPATIENT
Start: 2020-10-03 | End: 2020-10-04 | Stop reason: ALTCHOICE

## 2020-10-03 RX ORDER — MISOPROSTOL 200 UG/1
800 TABLET ORAL PRN
Status: DISCONTINUED | OUTPATIENT
Start: 2020-10-03 | End: 2020-10-06 | Stop reason: HOSPADM

## 2020-10-03 RX ORDER — IBUPROFEN 800 MG/1
800 TABLET ORAL EVERY 8 HOURS
Status: DISCONTINUED | OUTPATIENT
Start: 2020-10-03 | End: 2020-10-06 | Stop reason: HOSPADM

## 2020-10-03 RX ORDER — ONDANSETRON 2 MG/ML
4 INJECTION INTRAMUSCULAR; INTRAVENOUS EVERY 6 HOURS PRN
Status: DISCONTINUED | OUTPATIENT
Start: 2020-10-03 | End: 2020-10-06 | Stop reason: HOSPADM

## 2020-10-03 RX ORDER — KETOROLAC TROMETHAMINE 30 MG/ML
30 INJECTION, SOLUTION INTRAMUSCULAR; INTRAVENOUS EVERY 6 HOURS PRN
Status: DISCONTINUED | OUTPATIENT
Start: 2020-10-03 | End: 2020-10-06 | Stop reason: HOSPADM

## 2020-10-03 RX ORDER — ACETAMINOPHEN 325 MG/1
650 TABLET ORAL EVERY 4 HOURS PRN
Status: DISCONTINUED | OUTPATIENT
Start: 2020-10-03 | End: 2020-10-06 | Stop reason: HOSPADM

## 2020-10-03 RX ORDER — SODIUM CHLORIDE, SODIUM LACTATE, POTASSIUM CHLORIDE, AND CALCIUM CHLORIDE .6; .31; .03; .02 G/100ML; G/100ML; G/100ML; G/100ML
1000 INJECTION, SOLUTION INTRAVENOUS ONCE
Status: CANCELLED | OUTPATIENT
Start: 2020-10-03 | End: 2020-10-03

## 2020-10-03 RX ORDER — PRENATAL WITH FERROUS FUM AND FOLIC ACID 3080; 920; 120; 400; 22; 1.84; 3; 20; 10; 1; 12; 200; 27; 25; 2 [IU]/1; [IU]/1; MG/1; [IU]/1; MG/1; MG/1; MG/1; MG/1; MG/1; MG/1; UG/1; MG/1; MG/1; MG/1; MG/1
1 TABLET ORAL DAILY
Status: DISCONTINUED | OUTPATIENT
Start: 2020-10-03 | End: 2020-10-06 | Stop reason: HOSPADM

## 2020-10-03 RX ORDER — LIDOCAINE HYDROCHLORIDE AND EPINEPHRINE 20; 5 MG/ML; UG/ML
INJECTION, SOLUTION EPIDURAL; INFILTRATION; INTRACAUDAL; PERINEURAL PRN
Status: DISCONTINUED | OUTPATIENT
Start: 2020-10-03 | End: 2020-10-03 | Stop reason: SDUPTHER

## 2020-10-03 RX ORDER — MORPHINE SULFATE 1 MG/ML
INJECTION, SOLUTION EPIDURAL; INTRATHECAL; INTRAVENOUS PRN
Status: DISCONTINUED | OUTPATIENT
Start: 2020-10-03 | End: 2020-10-03 | Stop reason: SDUPTHER

## 2020-10-03 RX ORDER — KETOROLAC TROMETHAMINE 30 MG/ML
INJECTION, SOLUTION INTRAMUSCULAR; INTRAVENOUS
Status: COMPLETED
Start: 2020-10-03 | End: 2020-10-03

## 2020-10-03 RX ADMIN — Medication: at 00:40

## 2020-10-03 RX ADMIN — PHENYLEPHRINE HYDROCHLORIDE 100 MCG: 10 INJECTION INTRAVENOUS at 01:20

## 2020-10-03 RX ADMIN — ONDANSETRON 4 MG: 2 INJECTION INTRAMUSCULAR; INTRAVENOUS at 00:58

## 2020-10-03 RX ADMIN — KETOROLAC TROMETHAMINE 30 MG: 30 INJECTION, SOLUTION INTRAMUSCULAR; INTRAVENOUS at 03:43

## 2020-10-03 RX ADMIN — DOCUSATE SODIUM 100 MG: 100 CAPSULE ORAL at 20:17

## 2020-10-03 RX ADMIN — KETOROLAC TROMETHAMINE 30 MG: 30 INJECTION, SOLUTION INTRAMUSCULAR; INTRAVENOUS at 16:02

## 2020-10-03 RX ADMIN — TERBUTALINE SULFATE: 1 INJECTION SUBCUTANEOUS at 00:35

## 2020-10-03 RX ADMIN — SODIUM CHLORIDE, POTASSIUM CHLORIDE, SODIUM LACTATE AND CALCIUM CHLORIDE: 600; 310; 30; 20 INJECTION, SOLUTION INTRAVENOUS at 00:40

## 2020-10-03 RX ADMIN — MORPHINE SULFATE 3 MG: 1 INJECTION, SOLUTION EPIDURAL; INTRATHECAL; INTRAVENOUS at 00:58

## 2020-10-03 RX ADMIN — FENTANYL CITRATE 50 MCG: 50 INJECTION, SOLUTION INTRAMUSCULAR; INTRAVENOUS at 00:42

## 2020-10-03 RX ADMIN — Medication 334 ML/HR: at 00:57

## 2020-10-03 RX ADMIN — LIDOCAINE HYDROCHLORIDE,EPINEPHRINE BITARTRATE 5 ML: 20; .005 INJECTION, SOLUTION EPIDURAL; INFILTRATION; INTRACAUDAL; PERINEURAL at 00:39

## 2020-10-03 RX ADMIN — SODIUM CHLORIDE, POTASSIUM CHLORIDE, SODIUM LACTATE AND CALCIUM CHLORIDE: 600; 310; 30; 20 INJECTION, SOLUTION INTRAVENOUS at 09:28

## 2020-10-03 RX ADMIN — METHYLERGONOVINE MALEATE 200 MCG: 0.2 INJECTION, SOLUTION INTRAMUSCULAR; INTRAVENOUS at 01:15

## 2020-10-03 RX ADMIN — METFORMIN HYDROCHLORIDE 1 TABLET: 500 TABLET, EXTENDED RELEASE ORAL at 08:18

## 2020-10-03 RX ADMIN — LIDOCAINE HYDROCHLORIDE,EPINEPHRINE BITARTRATE 5 ML: 20; .005 INJECTION, SOLUTION EPIDURAL; INFILTRATION; INTRACAUDAL; PERINEURAL at 00:37

## 2020-10-03 RX ADMIN — MORPHINE SULFATE 1 MG: 1 INJECTION, SOLUTION EPIDURAL; INTRATHECAL; INTRAVENOUS at 01:01

## 2020-10-03 RX ADMIN — LIDOCAINE HYDROCHLORIDE,EPINEPHRINE BITARTRATE 5 ML: 20; .005 INJECTION, SOLUTION EPIDURAL; INFILTRATION; INTRACAUDAL; PERINEURAL at 00:44

## 2020-10-03 RX ADMIN — DOCUSATE SODIUM 100 MG: 100 CAPSULE ORAL at 08:18

## 2020-10-03 RX ADMIN — PHENYLEPHRINE HYDROCHLORIDE 100 MCG: 10 INJECTION INTRAVENOUS at 01:07

## 2020-10-03 RX ADMIN — LIDOCAINE HYDROCHLORIDE,EPINEPHRINE BITARTRATE 5 ML: 20; .005 INJECTION, SOLUTION EPIDURAL; INFILTRATION; INTRACAUDAL; PERINEURAL at 00:41

## 2020-10-03 RX ADMIN — SODIUM CITRATE AND CITRIC ACID MONOHYDRATE: 500; 334 SOLUTION ORAL at 00:40

## 2020-10-03 RX ADMIN — Medication 15 ML/HR: at 00:09

## 2020-10-03 RX ADMIN — PHENYLEPHRINE HYDROCHLORIDE 100 MCG: 10 INJECTION INTRAVENOUS at 01:05

## 2020-10-03 RX ADMIN — MORPHINE SULFATE 1 MG: 1 INJECTION, SOLUTION EPIDURAL; INTRATHECAL; INTRAVENOUS at 01:08

## 2020-10-03 RX ADMIN — PHENYLEPHRINE HYDROCHLORIDE 100 MCG: 10 INJECTION INTRAVENOUS at 01:09

## 2020-10-03 RX ADMIN — FENTANYL CITRATE 50 MCG: 50 INJECTION, SOLUTION INTRAMUSCULAR; INTRAVENOUS at 00:37

## 2020-10-03 ASSESSMENT — PULMONARY FUNCTION TESTS
PIF_VALUE: 0
PIF_VALUE: 1
PIF_VALUE: 0

## 2020-10-03 ASSESSMENT — PAIN SCALES - GENERAL
PAINLEVEL_OUTOF10: 7
PAINLEVEL_OUTOF10: 4

## 2020-10-03 NOTE — CARE COORDINATION
Social Work/Discharge Planning:  Social Work consult noted. Called RN Antonio Chaudhry in regards to consult. Antonio Chaudhry states patient will discharge home on Monday. Informed Tejal that  will assess patient on Monday. Will continue to follow.   Electronically signed by CHILO Anderson on 10/3/2020 at 1:49 PM

## 2020-10-03 NOTE — PLAN OF CARE
Problem: Pain - Acute:  Goal: Pain level will decrease  Description: Pain level will decrease  Outcome: Met This Shift  Goal: Able to cope with pain  Description: Able to cope with pain  Outcome: Met This Shift     Problem: Pain:  Goal: Pain level will decrease  Description: Pain level will decrease  Outcome: Met This Shift  Goal: Control of acute pain  Description: Control of acute pain  Outcome: Met This Shift

## 2020-10-03 NOTE — PROGRESS NOTES
Patient's alves emptied for 175 ml and then removed per order. IV fluids stopped at this time, patient then ambulated to the bathroom and tolerated well. Elisabeth care instructions given and then performed by the patient. New underwear, pads, bed pad, and gown all changed. Patient then ambulated back to the bedroom and is currently sitting up in the chair. Patient is DTV between [id number]. Will monitor.

## 2020-10-03 NOTE — PLAN OF CARE
Problem: Anxiety:  Goal: Level of anxiety will decrease  Description: Level of anxiety will decrease  Outcome: Completed     Problem: Breathing Pattern - Ineffective:  Goal: Able to breathe comfortably  Description: Able to breathe comfortably  Outcome: Completed     Problem: Fluid Volume - Imbalance:  Goal: Absence of imbalanced fluid volume signs and symptoms  Description: Absence of imbalanced fluid volume signs and symptoms  Outcome: Completed  Goal: Absence of intrapartum hemorrhage signs and symptoms  Description: Absence of intrapartum hemorrhage signs and symptoms  Outcome: Completed     Problem: Infection - Intrapartum Infection:  Goal: Will show no infection signs and symptoms  Description: Will show no infection signs and symptoms  Outcome: Completed     Problem: Labor Process - Prolonged:  Goal: Labor progression, first stage, within specified pattern  Description: Labor progression, first stage, within specified pattern  Outcome: Completed  Goal: Labor progession, second stage, within specified pattern  Description: Labor progession, second stage, within specified pattern  Outcome: Completed  Goal: Uterine contractions within specified parameters  Description: Uterine contractions within specified parameters  Outcome: Completed     Problem:  Screening:  Goal: Ability to make informed decisions regarding treatment has improved  Description: Ability to make informed decisions regarding treatment has improved  Outcome: Completed     Problem: Tissue Perfusion - Uteroplacental, Altered:  Goal: Absence of abnormal fetal heart rate pattern  Description: Absence of abnormal fetal heart rate pattern  Outcome: Completed

## 2020-10-03 NOTE — LACTATION NOTE
Mom formula feeding due to \"milk not being in\". Shown hand expression, drops of colostrum noted. Encouraged hand expressing drops when baby sleepy. Encouraged skin to skin and frequent attempts at breast to stimulate milk production. Instructed on normal infant behavior in the first 12-24 hours and importance of stimulating the baby frequently to eat during this time. Instructed on benefits of skin to skin and avoidance of pacifier / artificial nipple use until breastfeeding is well established. Educated on making sure infant has an open airway while breastfeeding and skin to skin. Instructed on hunger cues and waking techniques to try. Reviewed signs of adequate I & O; allow baby to feed ad terrence and not to limit time at breast. Information given regarding health benefits of colostrum and exclusive breastfeeding. Encouraged to call with any concerns. Mom requests an electric breast pump for home to increase milk supply. Lactation office # and [employer] information supplied for educational needs. Mom educated to abstain from using cannabis while breastfeeding since it crosses into the breast milk and can have neurological effects on the infant. [employer] information given on the dangers of using marijuana and breastfeeding.

## 2020-10-03 NOTE — ANESTHESIA POSTPROCEDURE EVALUATION
Department of Anesthesiology  Postprocedure Note    Patient: Stas Fink  MRN: [id number]  YOB: 1995  Date of evaluation: 10/3/2020  Time:  7:29 AM     Procedure Summary     Date:  10/02/20 Room / Location:  [facility]    Anesthesia Start:  2355 Anesthesia Stop:  10/03/20 0147    Procedures:        SECTION (N/A Uterus)      Labor Analgesia Diagnosis:      Surgeon:  Juanis Dugan MD Responsible Provider:  Elo Thompson MD    Anesthesia Type:  general, spinal, epidural ASA Status:  2          Anesthesia Type: general, spinal, epidural    Amrit Phase I: Amrit Score: 10    Amrit Phase II:      Last vitals: Reviewed and per EMR flowsheets.        Anesthesia Post Evaluation    Patient location during evaluation: bedside  Patient participation: complete - patient participated  Level of consciousness: awake and alert  Pain score: 0  Airway patency: patent  Nausea & Vomiting: no nausea and no vomiting  Complications: no  Cardiovascular status: blood pressure returned to baseline  Respiratory status: acceptable  Hydration status: euvolemic

## 2020-10-03 NOTE — PROGRESS NOTES
Primary c/s delivery of viable baby boy at 00:56. APGARS 8/9. NICU present at delivery for NRFHT's. VSS.

## 2020-10-03 NOTE — PLAN OF CARE
Problem: Fluid Volume - Imbalance:  Goal: Absence of postpartum hemorrhage signs and symptoms  Description: Absence of postpartum hemorrhage signs and symptoms  Outcome: Met This Shift  Goal: Absence of imbalanced fluid volume signs and symptoms  Description: Absence of imbalanced fluid volume signs and symptoms  Outcome: Met This Shift     Problem: Pain - Acute:  Goal: Pain level will decrease  Description: Pain level will decrease  Outcome: Met This Shift  Goal: Able to cope with pain  Description: Able to cope with pain  Outcome: Met This Shift     Problem: Pain:  Goal: Pain level will decrease  Description: Pain level will decrease  Outcome: Met This Shift  Goal: Control of acute pain  Description: Control of acute pain  Outcome: Met This Shift     Problem: Discharge Planning:  Goal: Discharged to appropriate level of care  Description: Discharged to appropriate level of care  Outcome: Met This Shift     Problem: Infection - Surgical Site:  Goal: Will show no infection signs and symptoms  Description: Will show no infection signs and symptoms  Outcome: Met This Shift     Problem: Mood - Altered:  Goal: Mood stable  Description: Mood stable  Outcome: Met This Shift     Problem: Nausea/Vomiting:  Goal: Absence of nausea/vomiting  Description: Absence of nausea/vomiting  Outcome: Met This Shift     Problem: Venous Thromboembolism:  Goal: Will show no signs or symptoms of venous thromboembolism  Description: Will show no signs or symptoms of venous thromboembolism  Outcome: Met This Shift  Goal: Absence of signs or symptoms of impaired coagulation  Description: Absence of signs or symptoms of impaired coagulation  Outcome: Met This Shift

## 2020-10-03 NOTE — PROGRESS NOTES
after laying patient back from epidural, 10 min PD occurred, IUR attempted, internal monitors placed by house officer at [employer]. FHTs recovered for 5 minutes then ctxs were tachysystole and FHTs had PD again. Terbutaline given, IUR attempted again. Dr Rene Funes now at bedside, VE 5cm, still high station. C/s called at [address] [place] [address]. Patient prepped and out of room at [place] [address].

## 2020-10-03 NOTE — PROCEDURES
Department of Obstetrics and Gynecology   Section Note    Indications: non-reassuring fetal status    Pre-operative Diagnosis: 41 week 0 day pregnancy. Post-operative Diagnosis: Living  infant(s) and Male    Surgeon: Patsy Stokes     Assistants: COURT Eubanks Anesthesia: Epidural anesthesia    ASA Class: 2    Procedure Details   The patient was seen in the Holding Room. The risks, benefits, complications, treatment options, and expected outcomes were discussed with the patient. The patient concurred with the proposed plan, giving informed consent. The site of surgery properly noted/marked. The patient was taken to Operating Room # 1, identified as Cirilo Concepcion and the procedure verified as  Delivery. A Time Out was held and the above information confirmed. After induction of anesthesia, the patient was draped and prepped in the usual sterile manner. A Pfannenstiel incision was made and carried down through the subcutaneous tissue to the fascia. Fascial incision was made and extended transversely. The fascia was  from the underlying rectus tissue superiorly and inferiorly. The peritoneum was identified and entered. Peritoneal incision was extended longitudinally. The utero-vesical peritoneal reflection was incised transversely and the bladder flap was bluntly freed from the lower uterine segment. A low transverse uterine incision was made. Delivered from L OT presentation was a 3080 gram male with Apgar scores of 8 at one minute and 9 at five minutes. After the umbilical cord was clamped and cut cord blood was obtained for evaluation. The placenta was removed intact and appeared normal. The uterine outline, tubes and ovaries appeared normal. The uterine incision was closed with running locked sutures of 0 Vicryl and a second layer of 0 Vicryl in a vertical imbricating stitch. Hemostasis was observed. Lavage was carried out until clear.   Gennaro and Interceed were placed over the uterine incision. The fascia was then reapproximated with running sutures of 0 Vicryl. The skin was reapproximated with absorbable skin staples and Dermabond. Instrument, sponge, and needle counts were correct prior the abdominal closure and at the conclusion of the case. Findings:  The placenta ovaries fallopian tubes were normal    Estimated Blood Loss:  600ml           Drains: Arreguin           Total IV Fluids: 1500 ml           Specimens: The placenta           Implants: None           Complications: None           Disposition: PACU - hemodynamically stable. Condition: infant stable to general nursery    Attending Attestation: I performed the procedure.

## 2020-10-03 NOTE — ANESTHESIA PROCEDURE NOTES
Epidural Block    Patient location during procedure: OB  Start time: 10/3/2020 2:55 AM  End time: 10/3/2020 12:05 AM  Reason for block: labor epidural  Staffing  Anesthesiologist: Dariela Natarajan MD  Resident/CRNA: HARPAL Delcid CRNA  Performed: resident/CRNA   Preanesthetic Checklist  Completed: patient identified, site marked, surgical consent, pre-op evaluation, timeout performed, IV checked, risks and benefits discussed, monitors and equipment checked, anesthesia consent given, oxygen available and patient being monitored  Epidural  Patient position: sitting  Prep: ChloraPrep  Patient monitoring: cardiac monitor, continuous pulse ox and frequent blood pressure checks  Approach: midline  Location: lumbar (1-5)  Injection technique: LIYA saline  Provider prep: mask and sterile gloves  Needle  Needle type: Tuohy   Needle gauge: 18 G  Needle length: 2.5 in  Needle insertion depth: 7 cm  Catheter type: end hole  Catheter size: 20 G.   Catheter at skin depth: 12 cm  Test dose: negative  Assessment  Hemodynamics: stable  Attempts: 1

## 2020-10-04 LAB
HCT VFR BLD CALC: 29 % (ref 34–48)
HEMOGLOBIN: 9.2 G/DL (ref 11.5–15.5)
MCH RBC QN AUTO: 28.9 PG (ref 26–35)
MCHC RBC AUTO-ENTMCNC: 31.7 % (ref 32–34.5)
MCV RBC AUTO: 91.2 FL (ref 80–99.9)
PDW BLD-RTO: 13.7 FL (ref 11.5–15)
PLATELET # BLD: 136 E9/L (ref 130–450)
PMV BLD AUTO: 12.5 FL (ref 7–12)
RBC # BLD: 3.18 E12/L (ref 3.5–5.5)
WBC # BLD: 13.3 E9/L (ref 4.5–11.5)

## 2020-10-04 PROCEDURE — 1220000000 HC SEMI PRIVATE OB R&B

## 2020-10-04 PROCEDURE — 6370000000 HC RX 637 (ALT 250 FOR IP): Performed by: OBSTETRICS & GYNECOLOGY

## 2020-10-04 PROCEDURE — 85027 COMPLETE CBC AUTOMATED: CPT

## 2020-10-04 PROCEDURE — 36415 COLL VENOUS BLD VENIPUNCTURE: CPT

## 2020-10-04 RX ADMIN — FERROUS SULFATE TAB 325 MG (65 MG ELEMENTAL FE) 325 MG: 325 (65 FE) TAB at 17:54

## 2020-10-04 RX ADMIN — IBUPROFEN 800 MG: 800 TABLET ORAL at 16:21

## 2020-10-04 RX ADMIN — IBUPROFEN 800 MG: 800 TABLET ORAL at 08:38

## 2020-10-04 RX ADMIN — METFORMIN HYDROCHLORIDE 1 TABLET: 500 TABLET, EXTENDED RELEASE ORAL at 08:37

## 2020-10-04 RX ADMIN — OXYCODONE HYDROCHLORIDE AND ACETAMINOPHEN 2 TABLET: 5; 325 TABLET ORAL at 05:33

## 2020-10-04 RX ADMIN — OXYCODONE HYDROCHLORIDE AND ACETAMINOPHEN 2 TABLET: 5; 325 TABLET ORAL at 21:55

## 2020-10-04 RX ADMIN — DOCUSATE SODIUM 100 MG: 100 CAPSULE ORAL at 21:51

## 2020-10-04 RX ADMIN — OXYCODONE HYDROCHLORIDE AND ACETAMINOPHEN 2 TABLET: 5; 325 TABLET ORAL at 14:04

## 2020-10-04 RX ADMIN — FERROUS SULFATE TAB 325 MG (65 MG ELEMENTAL FE) 325 MG: 325 (65 FE) TAB at 08:37

## 2020-10-04 RX ADMIN — DOCUSATE SODIUM 100 MG: 100 CAPSULE ORAL at 08:37

## 2020-10-04 ASSESSMENT — PAIN SCALES - GENERAL
PAINLEVEL_OUTOF10: 7
PAINLEVEL_OUTOF10: 2
PAINLEVEL_OUTOF10: 5
PAINLEVEL_OUTOF10: 8
PAINLEVEL_OUTOF10: 7
PAINLEVEL_OUTOF10: 3

## 2020-10-04 ASSESSMENT — PAIN DESCRIPTION - PAIN TYPE: TYPE: SURGICAL PAIN

## 2020-10-04 ASSESSMENT — PAIN DESCRIPTION - LOCATION: LOCATION: ABDOMEN;INCISION

## 2020-10-04 ASSESSMENT — PAIN DESCRIPTION - PROGRESSION: CLINICAL_PROGRESSION: GRADUALLY WORSENING

## 2020-10-04 ASSESSMENT — PAIN - FUNCTIONAL ASSESSMENT: PAIN_FUNCTIONAL_ASSESSMENT: ACTIVITIES ARE NOT PREVENTED

## 2020-10-04 ASSESSMENT — PAIN DESCRIPTION - DESCRIPTORS: DESCRIPTORS: BURNING;DISCOMFORT;SORE

## 2020-10-04 ASSESSMENT — PAIN DESCRIPTION - FREQUENCY: FREQUENCY: INTERMITTENT

## 2020-10-04 NOTE — LACTATION NOTE
Mom reports baby prefers cradle hold on the left side. Encouraged football hold on the right. Encouraged mom to call with any latch assistance needed throughout the day and skin to skin. Support provided.

## 2020-10-04 NOTE — PLAN OF CARE
Problem: Fluid Volume - Imbalance:  Goal: Absence of postpartum hemorrhage signs and symptoms  Description: Absence of postpartum hemorrhage signs and symptoms  Outcome: Met This Shift  Goal: Absence of imbalanced fluid volume signs and symptoms  Description: Absence of imbalanced fluid volume signs and symptoms  Outcome: Met This Shift     Problem: Pain - Acute:  Goal: Pain level will decrease  Description: Pain level will decrease  Outcome: Met This Shift  Goal: Able to cope with pain  Description: Able to cope with pain  Outcome: Met This Shift     Problem: Urinary Retention:  Goal: Experiences of bladder distention will decrease  Description: Experiences of bladder distention will decrease  Outcome: Met This Shift  Goal: Urinary elimination within specified parameters  Description: Urinary elimination within specified parameters  Outcome: Met This Shift     Problem: Pain:  Goal: Pain level will decrease  Description: Pain level will decrease  Outcome: Met This Shift  Goal: Control of acute pain  Description: Control of acute pain  Outcome: Met This Shift  Goal: Control of chronic pain  Description: Control of chronic pain  Outcome: Met This Shift     Problem: Discharge Planning:  Goal: Discharged to appropriate level of care  Description: Discharged to appropriate level of care  Outcome: Met This Shift     Problem: Infection - Surgical Site:  Goal: Will show no infection signs and symptoms  Description: Will show no infection signs and symptoms  Outcome: Met This Shift     Problem: Mood - Altered:  Goal: Mood stable  Description: Mood stable  Outcome: Met This Shift     Problem: Nausea/Vomiting:  Goal: Absence of nausea/vomiting  Description: Absence of nausea/vomiting  Outcome: Met This Shift     Problem: Venous Thromboembolism:  Goal: Will show no signs or symptoms of venous thromboembolism  Description: Will show no signs or symptoms of venous thromboembolism  Outcome: Met This Shift  Goal: Absence of signs or symptoms of impaired coagulation  Description: Absence of signs or symptoms of impaired coagulation  Outcome: Met This Shift

## 2020-10-04 NOTE — PROGRESS NOTES
OB/GYN SERVICE  Attending Post-Op Progress Note      SUBJECTIVE: Patient without complaints    OBJECTIVE      Physical    INTAKE/OUTPUT:  No intake or output data in the 24 hours ending 10/04/20 1301  TEMPERATURE:  Current - Temp: 98.5 °F (36.9 °C);  Max - Temp  Av.5 °F (36.9 °C)  Min: 98 °F (36.7 °C)  Max: 99 °F (37.2 °C)  RESPIRATIONS RANGE: Resp  Avg: 15.5  Min: 14  Max: 16  PULSE RANGE: Pulse  Av.7  Min: 83  Max: 111  BLOOD PRESSURE RANGE:  Systolic (23GLF), IWP:469 , Min:120 , FOH:835   ; Diastolic (48OIH), FCZ:48, Min:66, Max:81    CONSTITUTIONAL:  awake, alert, cooperative, no apparent distress, and appears stated age  LUNGS:  No increased work of breathing, good air exchange, clear to auscultation bilaterally, no crackles or wheezing  ABDOMEN: Incision is dry and clear without drainage and hypoactive bowel sounds  GENITAL/URINARY:  External Genitalia:  General appearance; normal, mild lochia rubra is present  Extremities: No calf tenderness +1 pedal edema  Data  CBC:   Lab Results   Component Value Date    WBC 13.3 10/04/2020    RBC 3.18 10/04/2020    HGB 9.2 10/04/2020    HCT 29.0 10/04/2020    MCV 91.2 10/04/2020    MCH 28.9 10/04/2020    MCHC 31.7 10/04/2020    RDW 13.7 10/04/2020     10/04/2020    MPV 12.5 10/04/2020       Current Inpatient Medications    Current Facility-Administered Medications: acetaminophen (TYLENOL) tablet 325 mg, 325 mg, Oral, Q4H PRN  oxyCODONE (ROXICODONE) immediate release tablet 5 mg, 5 mg, Oral, Q4H PRN **OR** oxyCODONE (ROXICODONE) immediate release tablet 10 mg, 10 mg, Oral, Q4H PRN  naloxone (NARCAN) injection 0.4 mg, 0.4 mg, Intravenous, PRN  diphenhydrAMINE (BENADRYL) injection 25 mg, 25 mg, Intravenous, Q6H PRN  nalbuphine (NUBAIN) injection 5 mg, 5 mg, Intravenous, Q4H PRN  ondansetron (ZOFRAN) injection 4 mg, 4 mg, Intravenous, Q6H PRN  ketorolac (TORADOL) injection 30 mg, 30 mg, Intravenous, Q6H PRN  meperidine (DEMEROL) injection 25 mg, 25 mg, Intravenous, Q4H PRN  lactated ringers infusion, , Intravenous, Continuous  sodium chloride flush 0.9 % injection 10 mL, 10 mL, Intravenous, 2 times per day  rho(D) immune globulin (HYPERRHO S/D) injection 300 mcg, 300 mcg, Intramuscular, Once  acetaminophen (TYLENOL) tablet 650 mg, 650 mg, Oral, Q4H PRN  docusate sodium (COLACE) capsule 100 mg, 100 mg, Oral, BID  lansinoh lanolin ointment, , Topical, Q1H PRN  Tetanus-Diphth-Acell Pertussis (BOOSTRIX) injection 0.5 mL, 0.5 mL, Intramuscular, Prior to discharge  ibuprofen (ADVIL;MOTRIN) tablet 800 mg, 800 mg, Oral, Q8H  oxyCODONE-acetaminophen (PERCOCET) 5-325 MG per tablet 1 tablet, 1 tablet, Oral, Q4H PRN **OR** oxyCODONE-acetaminophen (PERCOCET) 5-325 MG per tablet 2 tablet, 2 tablet, Oral, Q4H PRN  diphenhydrAMINE (BENADRYL) injection 25 mg, 25 mg, Intravenous, Q6H PRN  ondansetron (ZOFRAN) injection 4 mg, 4 mg, Intravenous, Q6H PRN  prenatal vitamin 27-1 MG tablet 1 tablet, 1 tablet, Oral, Daily  methylergonovine (METHERGINE) injection 200 mcg, 200 mcg, Intramuscular, PRN  carboprost (HEMABATE) injection 250 mcg, 250 mcg, Intramuscular, PRN  miSOPROStol (CYTOTEC) tablet 800 mcg, 800 mcg, Rectal, PRN  ferrous sulfate (IRON 325) tablet 325 mg, 325 mg, Oral, BID WC  lactated ringers infusion, , Intravenous, Continuous  butorphanol (STADOL) injection 1 mg, 1 mg, Intravenous, Q3H PRN **AND** butorphanol (STADOL) injection 1 mg, 1 mg, Intramuscular, Q3H PRN    ASSESSMENT AND PLAN    23 y.o. female status post  post op day #  1 progressing well  We will encourage ambulation and deep breathing    Ethan Navas  1:01 PM

## 2020-10-05 PROCEDURE — 6370000000 HC RX 637 (ALT 250 FOR IP): Performed by: OBSTETRICS & GYNECOLOGY

## 2020-10-05 PROCEDURE — 1220000000 HC SEMI PRIVATE OB R&B

## 2020-10-05 RX ADMIN — FERROUS SULFATE TAB 325 MG (65 MG ELEMENTAL FE) 325 MG: 325 (65 FE) TAB at 09:40

## 2020-10-05 RX ADMIN — IBUPROFEN 800 MG: 800 TABLET ORAL at 09:40

## 2020-10-05 RX ADMIN — IBUPROFEN 800 MG: 800 TABLET ORAL at 20:21

## 2020-10-05 RX ADMIN — DOCUSATE SODIUM 100 MG: 100 CAPSULE ORAL at 09:40

## 2020-10-05 RX ADMIN — OXYCODONE HYDROCHLORIDE AND ACETAMINOPHEN 2 TABLET: 5; 325 TABLET ORAL at 10:49

## 2020-10-05 RX ADMIN — FERROUS SULFATE TAB 325 MG (65 MG ELEMENTAL FE) 325 MG: 325 (65 FE) TAB at 16:05

## 2020-10-05 RX ADMIN — OXYCODONE HYDROCHLORIDE AND ACETAMINOPHEN 2 TABLET: 5; 325 TABLET ORAL at 05:22

## 2020-10-05 RX ADMIN — DOCUSATE SODIUM 100 MG: 100 CAPSULE ORAL at 20:21

## 2020-10-05 RX ADMIN — OXYCODONE HYDROCHLORIDE AND ACETAMINOPHEN 2 TABLET: 5; 325 TABLET ORAL at 16:06

## 2020-10-05 ASSESSMENT — PAIN SCALES - GENERAL
PAINLEVEL_OUTOF10: 4
PAINLEVEL_OUTOF10: 3
PAINLEVEL_OUTOF10: 9
PAINLEVEL_OUTOF10: 7
PAINLEVEL_OUTOF10: 7
PAINLEVEL_OUTOF10: 2

## 2020-10-05 ASSESSMENT — PAIN DESCRIPTION - PAIN TYPE
TYPE: ACUTE PAIN;SURGICAL PAIN
TYPE: ACUTE PAIN

## 2020-10-05 ASSESSMENT — PAIN - FUNCTIONAL ASSESSMENT
PAIN_FUNCTIONAL_ASSESSMENT: ACTIVITIES ARE NOT PREVENTED
PAIN_FUNCTIONAL_ASSESSMENT: ACTIVITIES ARE NOT PREVENTED

## 2020-10-05 ASSESSMENT — PAIN DESCRIPTION - FREQUENCY
FREQUENCY: INTERMITTENT
FREQUENCY: INTERMITTENT

## 2020-10-05 ASSESSMENT — PAIN DESCRIPTION - ORIENTATION: ORIENTATION: LOWER

## 2020-10-05 ASSESSMENT — PAIN DESCRIPTION - DESCRIPTORS
DESCRIPTORS: CRAMPING;BURNING
DESCRIPTORS: CRAMPING;SORE

## 2020-10-05 ASSESSMENT — PAIN DESCRIPTION - LOCATION
LOCATION: ABDOMEN
LOCATION: ABDOMEN;INCISION

## 2020-10-05 ASSESSMENT — PAIN DESCRIPTION - PROGRESSION: CLINICAL_PROGRESSION: GRADUALLY WORSENING

## 2020-10-05 ASSESSMENT — PAIN DESCRIPTION - ONSET: ONSET: ON-GOING

## 2020-10-05 NOTE — PROGRESS NOTES
OB/GYN SERVICE  Attending Post-Op Progress Note      SUBJECTIVE: Patient without complaints    OBJECTIVE      Physical    INTAKE/OUTPUT:    Intake/Output Summary (Last 24 hours) at 10/5/2020 1326  Last data filed at 10/4/2020 1915  Gross per 24 hour   Intake 600 ml   Output --   Net 600 ml     TEMPERATURE:  Current - Temp: 98.6 °F (37 °C);  Max - Temp  Av.4 °F (36.9 °C)  Min: 98.2 °F (36.8 °C)  Max: 98.6 °F (37 °C)  RESPIRATIONS RANGE: Resp  Av  Min: 16  Max: 18  PULSE RANGE: Pulse  Av  Min: 101  Max: 103  BLOOD PRESSURE RANGE:  Systolic (02GQW), ANM:390 , Min:130 , CRA:690   ; Diastolic (07YYY), ANNA:09, Min:73, Max:76    CONSTITUTIONAL:  awake, alert, cooperative, no apparent distress, and appears stated age  LUNGS:  No increased work of breathing, good air exchange, clear to auscultation bilaterally, no crackles or wheezing  ABDOMEN:  incision is dry and clear without drainage and normal bowel sounds  GENITAL/URINARY:  External Genitalia:  General appearance; normal, mild lochia rubra is present  Extremities: No calf tenderness +1 pedal edema  Data  CBC:   Lab Results   Component Value Date    WBC 13.3 10/04/2020    RBC 3.18 10/04/2020    HGB 9.2 10/04/2020    HCT 29.0 10/04/2020    MCV 91.2 10/04/2020    MCH 28.9 10/04/2020    MCHC 31.7 10/04/2020    RDW 13.7 10/04/2020     10/04/2020    MPV 12.5 10/04/2020       Current Inpatient Medications    Current Facility-Administered Medications: acetaminophen (TYLENOL) tablet 325 mg, 325 mg, Oral, Q4H PRN  naloxone (NARCAN) injection 0.4 mg, 0.4 mg, Intravenous, PRN  diphenhydrAMINE (BENADRYL) injection 25 mg, 25 mg, Intravenous, Q6H PRN  nalbuphine (NUBAIN) injection 5 mg, 5 mg, Intravenous, Q4H PRN  ondansetron (ZOFRAN) injection 4 mg, 4 mg, Intravenous, Q6H PRN  ketorolac (TORADOL) injection 30 mg, 30 mg, Intravenous, Q6H PRN  meperidine (DEMEROL) injection 25 mg, 25 mg, Intravenous, Q4H PRN  lactated ringers infusion, , Intravenous, Continuous  sodium chloride flush 0.9 % injection 10 mL, 10 mL, Intravenous, 2 times per day  rho(D) immune globulin (HYPERRHO S/D) injection 300 mcg, 300 mcg, Intramuscular, Once  acetaminophen (TYLENOL) tablet 650 mg, 650 mg, Oral, Q4H PRN  docusate sodium (COLACE) capsule 100 mg, 100 mg, Oral, BID  lansinoh lanolin ointment, , Topical, Q1H PRN  Tetanus-Diphth-Acell Pertussis (BOOSTRIX) injection 0.5 mL, 0.5 mL, Intramuscular, Prior to discharge  ibuprofen (ADVIL;MOTRIN) tablet 800 mg, 800 mg, Oral, Q8H  oxyCODONE-acetaminophen (PERCOCET) 5-325 MG per tablet 1 tablet, 1 tablet, Oral, Q4H PRN **OR** oxyCODONE-acetaminophen (PERCOCET) 5-325 MG per tablet 2 tablet, 2 tablet, Oral, Q4H PRN  diphenhydrAMINE (BENADRYL) injection 25 mg, 25 mg, Intravenous, Q6H PRN  ondansetron (ZOFRAN) injection 4 mg, 4 mg, Intravenous, Q6H PRN  prenatal vitamin 27-1 MG tablet 1 tablet, 1 tablet, Oral, Daily  methylergonovine (METHERGINE) injection 200 mcg, 200 mcg, Intramuscular, PRN  carboprost (HEMABATE) injection 250 mcg, 250 mcg, Intramuscular, PRN  miSOPROStol (CYTOTEC) tablet 800 mcg, 800 mcg, Rectal, PRN  ferrous sulfate (IRON 325) tablet 325 mg, 325 mg, Oral, BID WC  lactated ringers infusion, , Intravenous, Continuous  butorphanol (STADOL) injection 1 mg, 1 mg, Intravenous, Q3H PRN **AND** butorphanol (STADOL) injection 1 mg, 1 mg, Intramuscular, Q3H PRN    ASSESSMENT AND PLAN    23 y.o. female status post  post op day #  2 doing well  We will continue supportive care    Vince Navas  1:26 PM

## 2020-10-05 NOTE — CARE COORDINATION
SW Discharge Planning     SW called /[facility] and spoke with Nakia Walsh in intake, who reported that [facility] ( [phone]) will NOT be involved at this time     PLAN    Baby CAN be discharged home when medically ready, children services will NOT be involved      Electronically signed by CHILO Ferguson on 10/5/2020 at 2:21 PM

## 2020-10-05 NOTE — CARE COORDINATION
SW Discharge Planning   SW received consult for mother with positive UDS for [facility] on 10/2. Baby was noted to be negative for all substances on 10/3. SW met with Agustin Ko ( [phone]) first time mother to baby boy Sriram Mcneill 3rd ( 10/3/[birth year]) and introduced self and role. Also present in the room was baby's father, Sriram Mcneill ( 3/2/81) who Chandrika Barbra gave SW permission to speak freely in front of. Chandrika Belle stated that she resides with Crystalblakechelsy Small at the address listed in the chart, and baby is the first for both. Chandrika Belle reported that she is currently unemployed and Maicol Small works at [employer]; baby will be added to [employer]. Per Chandrika Belle, prenatal care was with Dr. Noemy Hinkle, and pediatric care will be with Dr. Elvira Romero. Chandrika Belle Reported that she has all needed items including a car seat and pack and play. We discussed safe sleep practices. Jakezach Belle was agreeable to a [facility] and [facility] referral. Jakezach Belle  denied any past or current history of children services involvement, legal issues, substance abuse aside from [facility], domestic violence or mental health issues. Jakezach Belle reported to SW that smoking THC \" helped me eat during my pregnancy\". Jakezach Belle expressed understanding for the need of a [facility] ( [phone]) referral.     SW completed [facility] ( [phone]) referral to Donnie Orr in intake. PLAN    [facility] ( [phone][facility]  SW to continue communication with nursing staff and [facility] ( [phone])     SW completed [facility] and [facility] referrals.        Electronically signed by CHILO Resendez on 10/5/2020 at 11:33 AM

## 2020-10-06 VITALS
OXYGEN SATURATION: 98 % | HEART RATE: 99 BPM | WEIGHT: 202.4 LBS | SYSTOLIC BLOOD PRESSURE: 133 MMHG | RESPIRATION RATE: 18 BRPM | HEIGHT: 65 IN | BODY MASS INDEX: 33.72 KG/M2 | DIASTOLIC BLOOD PRESSURE: 87 MMHG | TEMPERATURE: 98 F

## 2020-10-06 PROCEDURE — 6370000000 HC RX 637 (ALT 250 FOR IP): Performed by: OBSTETRICS & GYNECOLOGY

## 2020-10-06 RX ORDER — GUAIFENESIN/DEXTROMETHORPHAN 100-10MG/5
5 SYRUP ORAL EVERY 4 HOURS PRN
Qty: 120 ML | Refills: 2 | Status: SHIPPED | OUTPATIENT
Start: 2020-10-06 | End: 2020-10-16

## 2020-10-06 RX ORDER — IBUPROFEN 800 MG/1
800 TABLET ORAL EVERY 8 HOURS
Qty: 60 TABLET | Refills: 3 | Status: ON HOLD | OUTPATIENT
Start: 2020-10-06 | End: 2022-02-28 | Stop reason: SDUPTHER

## 2020-10-06 RX ORDER — MODIFIED LANOLIN
1 OINTMENT (GRAM) TOPICAL
Qty: 1 TUBE | Refills: 3 | Status: SHIPPED | OUTPATIENT
Start: 2020-10-06

## 2020-10-06 RX ORDER — PSEUDOEPHEDRINE HCL 30 MG
100 TABLET ORAL 2 TIMES DAILY
Qty: 60 CAPSULE | Refills: 3 | Status: ON HOLD | OUTPATIENT
Start: 2020-10-06 | End: 2022-02-28 | Stop reason: SDUPTHER

## 2020-10-06 RX ORDER — GUAIFENESIN/DEXTROMETHORPHAN 100-10MG/5
5 SYRUP ORAL EVERY 4 HOURS PRN
Status: DISCONTINUED | OUTPATIENT
Start: 2020-10-06 | End: 2020-10-06 | Stop reason: HOSPADM

## 2020-10-06 RX ORDER — FERROUS SULFATE 325(65) MG
325 TABLET ORAL 2 TIMES DAILY WITH MEALS
Qty: 60 TABLET | Refills: 3 | Status: ON HOLD | OUTPATIENT
Start: 2020-10-06 | End: 2022-02-28 | Stop reason: SDUPTHER

## 2020-10-06 RX ORDER — OXYCODONE HYDROCHLORIDE AND ACETAMINOPHEN 5; 325 MG/1; MG/1
1 TABLET ORAL EVERY 6 HOURS PRN
Qty: 20 TABLET | Refills: 0 | Status: SHIPPED | OUTPATIENT
Start: 2020-10-06 | End: 2020-10-11

## 2020-10-06 RX ADMIN — OXYCODONE HYDROCHLORIDE AND ACETAMINOPHEN 1 TABLET: 5; 325 TABLET ORAL at 02:01

## 2020-10-06 RX ADMIN — METFORMIN HYDROCHLORIDE 1 TABLET: 500 TABLET, EXTENDED RELEASE ORAL at 08:24

## 2020-10-06 RX ADMIN — OXYCODONE HYDROCHLORIDE AND ACETAMINOPHEN 2 TABLET: 5; 325 TABLET ORAL at 08:24

## 2020-10-06 RX ADMIN — IBUPROFEN 800 MG: 800 TABLET ORAL at 04:32

## 2020-10-06 RX ADMIN — GUAIFENESIN AND DEXTROMETHORPHAN 5 ML: 100; 10 SYRUP ORAL at 15:28

## 2020-10-06 RX ADMIN — DOCUSATE SODIUM 100 MG: 100 CAPSULE ORAL at 08:24

## 2020-10-06 RX ADMIN — FERROUS SULFATE TAB 325 MG (65 MG ELEMENTAL FE) 325 MG: 325 (65 FE) TAB at 08:24

## 2020-10-06 RX ADMIN — OXYCODONE HYDROCHLORIDE AND ACETAMINOPHEN 2 TABLET: 5; 325 TABLET ORAL at 15:27

## 2020-10-06 RX ADMIN — GUAIFENESIN AND DEXTROMETHORPHAN 5 ML: 100; 10 SYRUP ORAL at 10:30

## 2020-10-06 ASSESSMENT — PAIN SCALES - GENERAL
PAINLEVEL_OUTOF10: 4
PAINLEVEL_OUTOF10: 7
PAINLEVEL_OUTOF10: 2
PAINLEVEL_OUTOF10: 7

## 2020-10-06 NOTE — PLAN OF CARE
Problem: Pain:  Goal: Pain level will decrease  Description: Pain level will decrease  10/5/2020 2321 by Ge Lima RN  Outcome: Ongoing  10/5/2020 1123 by Rosalind Baltazar RN  Outcome: Ongoing

## 2020-10-06 NOTE — PROGRESS NOTES
OB/GYN SERVICE  Attending Post-Op Progress Note      SUBJECTIVE: Patient without complaints    OBJECTIVE      Physical    INTAKE/OUTPUT:  No intake or output data in the 24 hours ending 10/06/20 1803  TEMPERATURE:  Current - Temp: 98 °F (36.7 °C);  Max - Temp  Av.9 °F (36.6 °C)  Min: 97.8 °F (36.6 °C)  Max: 98 °F (36.7 °C)  RESPIRATIONS RANGE: Resp  Av  Min: 18  Max: 18  PULSE RANGE: Pulse  Av.5  Min: 98  Max: 99  BLOOD PRESSURE RANGE:  Systolic (23QHV), LRB:684 , Min:133 , MIRIAM:261   ; Diastolic (40CRN), NUL:23, Min:87, Max:98    CONSTITUTIONAL:  awake, alert, cooperative, no apparent distress, and appears stated age  LUNGS:  No increased work of breathing, good air exchange, clear to auscultation bilaterally, no crackles or wheezing  ABDOMEN: Incision is dry and clear without drainage and normal bowel sounds  GENITAL/URINARY:  External Genitalia:  General appearance; normal, mild lochia rubra is present  Extremities: No calf tenderness +1 pedal edema  Data  CBC:   Lab Results   Component Value Date    WBC 13.3 10/04/2020    RBC 3.18 10/04/2020    HGB 9.2 10/04/2020    HCT 29.0 10/04/2020    MCV 91.2 10/04/2020    MCH 28.9 10/04/2020    MCHC 31.7 10/04/2020    RDW 13.7 10/04/2020     10/04/2020    MPV 12.5 10/04/2020       Current Inpatient Medications    Current Facility-Administered Medications: guaiFENesin-dextromethorphan (ROBITUSSIN DM) 100-10 MG/5ML syrup 5 mL, 5 mL, Oral, Q4H PRN  acetaminophen (TYLENOL) tablet 325 mg, 325 mg, Oral, Q4H PRN  naloxone (NARCAN) injection 0.4 mg, 0.4 mg, Intravenous, PRN  diphenhydrAMINE (BENADRYL) injection 25 mg, 25 mg, Intravenous, Q6H PRN  nalbuphine (NUBAIN) injection 5 mg, 5 mg, Intravenous, Q4H PRN  ondansetron (ZOFRAN) injection 4 mg, 4 mg, Intravenous, Q6H PRN  ketorolac (TORADOL) injection 30 mg, 30 mg, Intravenous, Q6H PRN  meperidine (DEMEROL) injection 25 mg, 25 mg, Intravenous, Q4H PRN  lactated ringers infusion, , Intravenous, Continuous  sodium chloride flush 0.9 % injection 10 mL, 10 mL, Intravenous, 2 times per day  rho(D) immune globulin (HYPERRHO S/D) injection 300 mcg, 300 mcg, Intramuscular, Once  acetaminophen (TYLENOL) tablet 650 mg, 650 mg, Oral, Q4H PRN  docusate sodium (COLACE) capsule 100 mg, 100 mg, Oral, BID  lansinoh lanolin ointment, , Topical, Q1H PRN  Tetanus-Diphth-Acell Pertussis (BOOSTRIX) injection 0.5 mL, 0.5 mL, Intramuscular, Prior to discharge  ibuprofen (ADVIL;MOTRIN) tablet 800 mg, 800 mg, Oral, Q8H  oxyCODONE-acetaminophen (PERCOCET) 5-325 MG per tablet 1 tablet, 1 tablet, Oral, Q4H PRN **OR** oxyCODONE-acetaminophen (PERCOCET) 5-325 MG per tablet 2 tablet, 2 tablet, Oral, Q4H PRN  diphenhydrAMINE (BENADRYL) injection 25 mg, 25 mg, Intravenous, Q6H PRN  ondansetron (ZOFRAN) injection 4 mg, 4 mg, Intravenous, Q6H PRN  prenatal vitamin 27-1 MG tablet 1 tablet, 1 tablet, Oral, Daily  methylergonovine (METHERGINE) injection 200 mcg, 200 mcg, Intramuscular, PRN  carboprost (HEMABATE) injection 250 mcg, 250 mcg, Intramuscular, PRN  miSOPROStol (CYTOTEC) tablet 800 mcg, 800 mcg, Rectal, PRN  ferrous sulfate (IRON 325) tablet 325 mg, 325 mg, Oral, BID WC  lactated ringers infusion, , Intravenous, Continuous  butorphanol (STADOL) injection 1 mg, 1 mg, Intravenous, Q3H PRN **AND** butorphanol (STADOL) injection 1 mg, 1 mg, Intramuscular, Q3H PRN    ASSESSMENT AND PLAN    23 y.o. female status post  post op day #  3 doing well  Patient is for discharge to home today    Diamante Edge  6:03 PM

## 2020-10-06 NOTE — DISCHARGE SUMMARY
Obstetrical Discharge Form    Primary OB Clinician: COURT Joyce,  FACOG  Postpartum/postoperative day #3    Gestational Age at time of delivery: 36 weeks    Date of Delivery: 10/3/2020; Time of Delivery: 147    Delivery Type: primary  section, low transverse incision    Baby: Liveborn male,     Intrapartum complications: Non-reassuring Fetal Status    Laceration: none    Episiotomy: none,    Feeding method: both breast and bottle - Similac with iron    Rh Immune globulin given: no    Rubella vaccine given: no    Discharge Date: 10/6/2020; Discharge Time: 181    Early Discharge:  NO, condition at time of discharge to home is good as well as disposition    Plan:     Follow-up appointment with Dr. Marcello Gordon in 2 weeks.

## 2020-10-06 NOTE — PROGRESS NOTES
Pt having congested cough-spitting up clear frothy phlegm. Robitussin given per order. VS-98.9-90-/89. Lung sounds diminished in all fields. o2 sat=100% on room air. Pt told to relax in bed and use splinted blanket to cough.

## 2020-10-06 NOTE — LACTATION NOTE
Pt reports baby is latching well and her breasts are filling today. Encouraged to increase breast feeding and decrease formula use to ensure an adequate milk supply. Pt denies any questions or concerns.

## 2020-10-09 LAB — CANNABINOIDS CONF, URINE: 46 NG/ML

## 2020-10-14 NOTE — CARE COORDINATION
SW Discharge Planning     SW noted baby's cordstat was positive for THC. SW called [facility] ( [phone]) and provided information to , Leopoldo Median.      Electronically signed by CHILO Adkins on 10/14/2020 at 11:37 AM

## 2022-02-25 ENCOUNTER — ANESTHESIA (OUTPATIENT)
Dept: LABOR AND DELIVERY | Age: 27
DRG: 540 | End: 2022-02-25
Payer: MEDICAID

## 2022-02-25 ENCOUNTER — HOSPITAL ENCOUNTER (INPATIENT)
Age: 27
LOS: 3 days | Discharge: HOME | DRG: 540 | End: 2022-02-28
Attending: OBSTETRICS & GYNECOLOGY | Admitting: OBSTETRICS & GYNECOLOGY
Payer: MEDICAID

## 2022-02-25 ENCOUNTER — ANESTHESIA EVENT (OUTPATIENT)
Dept: LABOR AND DELIVERY | Age: 27
DRG: 540 | End: 2022-02-25
Payer: MEDICAID

## 2022-02-25 VITALS — SYSTOLIC BLOOD PRESSURE: 111 MMHG | DIASTOLIC BLOOD PRESSURE: 55 MMHG | OXYGEN SATURATION: 100 %

## 2022-02-25 PROBLEM — Z3A.39 39 WEEKS GESTATION OF PREGNANCY: Status: ACTIVE | Noted: 2022-02-25

## 2022-02-25 LAB
ABO/RH: NORMAL
AMPHETAMINE SCREEN, URINE: POSITIVE
ANTIBODY SCREEN: NORMAL
BARBITURATE SCREEN URINE: NOT DETECTED
BENZODIAZEPINE SCREEN, URINE: NOT DETECTED
CANNABINOID SCREEN URINE: POSITIVE
COCAINE METABOLITE SCREEN URINE: NOT DETECTED
FENTANYL SCREEN, URINE: NOT DETECTED
HCT VFR BLD CALC: 39.7 % (ref 34–48)
HEMOGLOBIN: 12.8 G/DL (ref 11.5–15.5)
Lab: ABNORMAL
MCH RBC QN AUTO: 28.8 PG (ref 26–35)
MCHC RBC AUTO-ENTMCNC: 32.2 % (ref 32–34.5)
MCV RBC AUTO: 89.4 FL (ref 80–99.9)
METHADONE SCREEN, URINE: NOT DETECTED
OPIATE SCREEN URINE: NOT DETECTED
OXYCODONE URINE: NOT DETECTED
PDW BLD-RTO: 14.2 FL (ref 11.5–15)
PHENCYCLIDINE SCREEN URINE: NOT DETECTED
PLATELET # BLD: 188 E9/L (ref 130–450)
PMV BLD AUTO: 13.2 FL (ref 7–12)
RBC # BLD: 4.44 E12/L (ref 3.5–5.5)
WBC # BLD: 7.5 E9/L (ref 4.5–11.5)

## 2022-02-25 PROCEDURE — G0480 DRUG TEST DEF 1-7 CLASSES: HCPCS

## 2022-02-25 PROCEDURE — 2580000003 HC RX 258: Performed by: OBSTETRICS & GYNECOLOGY

## 2022-02-25 PROCEDURE — 2709999900 HC NON-CHARGEABLE SUPPLY: Performed by: OBSTETRICS & GYNECOLOGY

## 2022-02-25 PROCEDURE — 6370000000 HC RX 637 (ALT 250 FOR IP): Performed by: OBSTETRICS & GYNECOLOGY

## 2022-02-25 PROCEDURE — 3609079900 HC CESAREAN SECTION: Performed by: OBSTETRICS & GYNECOLOGY

## 2022-02-25 PROCEDURE — 7100000000 HC PACU RECOVERY - FIRST 15 MIN: Performed by: OBSTETRICS & GYNECOLOGY

## 2022-02-25 PROCEDURE — 86762 RUBELLA ANTIBODY: CPT

## 2022-02-25 PROCEDURE — 59514 CESAREAN DELIVERY ONLY: CPT | Performed by: OBSTETRICS & GYNECOLOGY

## 2022-02-25 PROCEDURE — 86592 SYPHILIS TEST NON-TREP QUAL: CPT

## 2022-02-25 PROCEDURE — 85027 COMPLETE CBC AUTOMATED: CPT

## 2022-02-25 PROCEDURE — 3700000001 HC ADD 15 MINUTES (ANESTHESIA): Performed by: OBSTETRICS & GYNECOLOGY

## 2022-02-25 PROCEDURE — 6360000002 HC RX W HCPCS: Performed by: OBSTETRICS & GYNECOLOGY

## 2022-02-25 PROCEDURE — 6360000002 HC RX W HCPCS: Performed by: ANESTHESIOLOGY

## 2022-02-25 PROCEDURE — 86900 BLOOD TYPING SEROLOGIC ABO: CPT

## 2022-02-25 PROCEDURE — 2500000003 HC RX 250 WO HCPCS

## 2022-02-25 PROCEDURE — 80307 DRUG TEST PRSMV CHEM ANLYZR: CPT

## 2022-02-25 PROCEDURE — 6360000002 HC RX W HCPCS

## 2022-02-25 PROCEDURE — 1220000000 HC SEMI PRIVATE OB R&B

## 2022-02-25 PROCEDURE — 86850 RBC ANTIBODY SCREEN: CPT

## 2022-02-25 PROCEDURE — 86901 BLOOD TYPING SEROLOGIC RH(D): CPT

## 2022-02-25 PROCEDURE — 7100000001 HC PACU RECOVERY - ADDTL 15 MIN: Performed by: OBSTETRICS & GYNECOLOGY

## 2022-02-25 PROCEDURE — 87340 HEPATITIS B SURFACE AG IA: CPT

## 2022-02-25 PROCEDURE — 36415 COLL VENOUS BLD VENIPUNCTURE: CPT

## 2022-02-25 PROCEDURE — 86703 HIV-1/HIV-2 1 RESULT ANTBDY: CPT

## 2022-02-25 PROCEDURE — 3700000000 HC ANESTHESIA ATTENDED CARE: Performed by: OBSTETRICS & GYNECOLOGY

## 2022-02-25 RX ORDER — ONDANSETRON 2 MG/ML
INJECTION INTRAMUSCULAR; INTRAVENOUS PRN
Status: DISCONTINUED | OUTPATIENT
Start: 2022-02-25 | End: 2022-02-25 | Stop reason: SDUPTHER

## 2022-02-25 RX ORDER — DIPHENHYDRAMINE HYDROCHLORIDE 50 MG/ML
12.5 INJECTION INTRAMUSCULAR; INTRAVENOUS EVERY 6 HOURS PRN
Status: ACTIVE | OUTPATIENT
Start: 2022-02-25 | End: 2022-02-26

## 2022-02-25 RX ORDER — TRISODIUM CITRATE DIHYDRATE AND CITRIC ACID MONOHYDRATE 500; 334 MG/5ML; MG/5ML
30 SOLUTION ORAL ONCE
Status: COMPLETED | OUTPATIENT
Start: 2022-02-25 | End: 2022-02-25

## 2022-02-25 RX ORDER — SODIUM CHLORIDE 9 MG/ML
25 INJECTION, SOLUTION INTRAVENOUS PRN
Status: DISCONTINUED | OUTPATIENT
Start: 2022-02-25 | End: 2022-02-28 | Stop reason: HOSPADM

## 2022-02-25 RX ORDER — OXYCODONE HYDROCHLORIDE 5 MG/1
10 TABLET ORAL EVERY 4 HOURS PRN
Status: DISCONTINUED | OUTPATIENT
Start: 2022-02-25 | End: 2022-02-28 | Stop reason: HOSPADM

## 2022-02-25 RX ORDER — SODIUM CHLORIDE 0.9 % (FLUSH) 0.9 %
5-40 SYRINGE (ML) INJECTION EVERY 12 HOURS SCHEDULED
Status: DISCONTINUED | OUTPATIENT
Start: 2022-02-25 | End: 2022-02-25 | Stop reason: HOSPADM

## 2022-02-25 RX ORDER — SODIUM CHLORIDE, SODIUM LACTATE, POTASSIUM CHLORIDE, CALCIUM CHLORIDE 600; 310; 30; 20 MG/100ML; MG/100ML; MG/100ML; MG/100ML
INJECTION, SOLUTION INTRAVENOUS CONTINUOUS
Status: DISCONTINUED | OUTPATIENT
Start: 2022-02-25 | End: 2022-02-28 | Stop reason: HOSPADM

## 2022-02-25 RX ORDER — SODIUM CHLORIDE 0.9 % (FLUSH) 0.9 %
5-40 SYRINGE (ML) INJECTION PRN
Status: DISCONTINUED | OUTPATIENT
Start: 2022-02-25 | End: 2022-02-25 | Stop reason: HOSPADM

## 2022-02-25 RX ORDER — DOCUSATE SODIUM 100 MG/1
100 CAPSULE, LIQUID FILLED ORAL 2 TIMES DAILY
Status: DISCONTINUED | OUTPATIENT
Start: 2022-02-25 | End: 2022-02-28 | Stop reason: HOSPADM

## 2022-02-25 RX ORDER — PHENYLEPHRINE HCL IN 0.9% NACL 1 MG/10 ML
SYRINGE (ML) INTRAVENOUS PRN
Status: DISCONTINUED | OUTPATIENT
Start: 2022-02-25 | End: 2022-02-25 | Stop reason: SDUPTHER

## 2022-02-25 RX ORDER — KETOROLAC TROMETHAMINE 30 MG/ML
30 INJECTION, SOLUTION INTRAMUSCULAR; INTRAVENOUS EVERY 6 HOURS PRN
Status: DISPENSED | OUTPATIENT
Start: 2022-02-25 | End: 2022-02-26

## 2022-02-25 RX ORDER — SODIUM CHLORIDE 0.9 % (FLUSH) 0.9 %
5-40 SYRINGE (ML) INJECTION PRN
Status: DISCONTINUED | OUTPATIENT
Start: 2022-02-25 | End: 2022-02-28 | Stop reason: HOSPADM

## 2022-02-25 RX ORDER — SODIUM CHLORIDE, SODIUM LACTATE, POTASSIUM CHLORIDE, CALCIUM CHLORIDE 600; 310; 30; 20 MG/100ML; MG/100ML; MG/100ML; MG/100ML
INJECTION, SOLUTION INTRAVENOUS CONTINUOUS
Status: DISCONTINUED | OUTPATIENT
Start: 2022-02-25 | End: 2022-02-25 | Stop reason: SDUPTHER

## 2022-02-25 RX ORDER — MODIFIED LANOLIN
OINTMENT (GRAM) TOPICAL
Status: DISCONTINUED | OUTPATIENT
Start: 2022-02-25 | End: 2022-02-28 | Stop reason: HOSPADM

## 2022-02-25 RX ORDER — SODIUM CHLORIDE 0.9 % (FLUSH) 0.9 %
5-40 SYRINGE (ML) INJECTION EVERY 12 HOURS SCHEDULED
Status: DISCONTINUED | OUTPATIENT
Start: 2022-02-25 | End: 2022-02-28 | Stop reason: HOSPADM

## 2022-02-25 RX ORDER — FENTANYL CITRATE 50 UG/ML
25 INJECTION, SOLUTION INTRAMUSCULAR; INTRAVENOUS EVERY 5 MIN PRN
Status: DISCONTINUED | OUTPATIENT
Start: 2022-02-25 | End: 2022-02-25 | Stop reason: HOSPADM

## 2022-02-25 RX ORDER — ACETAMINOPHEN 325 MG/1
650 TABLET ORAL EVERY 4 HOURS PRN
Status: DISCONTINUED | OUTPATIENT
Start: 2022-02-25 | End: 2022-02-28 | Stop reason: HOSPADM

## 2022-02-25 RX ORDER — MORPHINE SULFATE 1 MG/ML
INJECTION, SOLUTION EPIDURAL; INTRATHECAL; INTRAVENOUS PRN
Status: DISCONTINUED | OUTPATIENT
Start: 2022-02-25 | End: 2022-02-25 | Stop reason: SDUPTHER

## 2022-02-25 RX ORDER — SODIUM CHLORIDE, SODIUM LACTATE, POTASSIUM CHLORIDE, AND CALCIUM CHLORIDE .6; .31; .03; .02 G/100ML; G/100ML; G/100ML; G/100ML
1000 INJECTION, SOLUTION INTRAVENOUS ONCE
Status: COMPLETED | OUTPATIENT
Start: 2022-02-25 | End: 2022-02-25

## 2022-02-25 RX ORDER — OXYCODONE HYDROCHLORIDE 5 MG/1
5 TABLET ORAL EVERY 4 HOURS PRN
Status: DISCONTINUED | OUTPATIENT
Start: 2022-02-25 | End: 2022-02-28 | Stop reason: HOSPADM

## 2022-02-25 RX ORDER — NALOXONE HYDROCHLORIDE 0.4 MG/ML
0.4 INJECTION, SOLUTION INTRAMUSCULAR; INTRAVENOUS; SUBCUTANEOUS PRN
Status: DISCONTINUED | OUTPATIENT
Start: 2022-02-25 | End: 2022-02-28 | Stop reason: HOSPADM

## 2022-02-25 RX ORDER — OXYCODONE HYDROCHLORIDE AND ACETAMINOPHEN 5; 325 MG/1; MG/1
1 TABLET ORAL EVERY 6 HOURS PRN
Status: ACTIVE | OUTPATIENT
Start: 2022-02-25 | End: 2022-02-26

## 2022-02-25 RX ORDER — ONDANSETRON 2 MG/ML
4 INJECTION INTRAMUSCULAR; INTRAVENOUS EVERY 6 HOURS PRN
Status: ACTIVE | OUTPATIENT
Start: 2022-02-25 | End: 2022-02-26

## 2022-02-25 RX ORDER — SODIUM CHLORIDE 9 MG/ML
25 INJECTION, SOLUTION INTRAVENOUS PRN
Status: DISCONTINUED | OUTPATIENT
Start: 2022-02-25 | End: 2022-02-25 | Stop reason: HOSPADM

## 2022-02-25 RX ORDER — BUPIVACAINE HYDROCHLORIDE 7.5 MG/ML
INJECTION, SOLUTION INTRASPINAL PRN
Status: DISCONTINUED | OUTPATIENT
Start: 2022-02-25 | End: 2022-02-25 | Stop reason: SDUPTHER

## 2022-02-25 RX ORDER — SIMETHICONE 80 MG
80 TABLET,CHEWABLE ORAL EVERY 6 HOURS PRN
Status: DISCONTINUED | OUTPATIENT
Start: 2022-02-25 | End: 2022-02-28 | Stop reason: HOSPADM

## 2022-02-25 RX ORDER — OXYCODONE HYDROCHLORIDE AND ACETAMINOPHEN 5; 325 MG/1; MG/1
2 TABLET ORAL EVERY 6 HOURS PRN
Status: ACTIVE | OUTPATIENT
Start: 2022-02-25 | End: 2022-02-26

## 2022-02-25 RX ADMIN — SODIUM CHLORIDE, POTASSIUM CHLORIDE, SODIUM LACTATE AND CALCIUM CHLORIDE: 600; 310; 30; 20 INJECTION, SOLUTION INTRAVENOUS at 12:06

## 2022-02-25 RX ADMIN — SODIUM CITRATE AND CITRIC ACID MONOHYDRATE 30 ML: 500; 334 SOLUTION ORAL at 11:08

## 2022-02-25 RX ADMIN — BUPIVACAINE HYDROCHLORIDE IN DEXTROSE 1.6 ML: 7.5 INJECTION, SOLUTION SUBARACHNOID at 11:41

## 2022-02-25 RX ADMIN — SODIUM CHLORIDE, POTASSIUM CHLORIDE, SODIUM LACTATE AND CALCIUM CHLORIDE: 600; 310; 30; 20 INJECTION, SOLUTION INTRAVENOUS at 11:21

## 2022-02-25 RX ADMIN — DOCUSATE SODIUM 100 MG: 100 CAPSULE, LIQUID FILLED ORAL at 20:06

## 2022-02-25 RX ADMIN — SIMETHICONE 80 MG: 80 TABLET, CHEWABLE ORAL at 20:06

## 2022-02-25 RX ADMIN — Medication 200 MCG: at 12:06

## 2022-02-25 RX ADMIN — KETOROLAC TROMETHAMINE 30 MG: 30 INJECTION, SOLUTION INTRAMUSCULAR; INTRAVENOUS at 13:51

## 2022-02-25 RX ADMIN — Medication 5 ML: at 21:40

## 2022-02-25 RX ADMIN — Medication 200 MCG: at 11:56

## 2022-02-25 RX ADMIN — KETOROLAC TROMETHAMINE 30 MG: 30 INJECTION, SOLUTION INTRAMUSCULAR; INTRAVENOUS at 20:06

## 2022-02-25 RX ADMIN — SODIUM CHLORIDE, POTASSIUM CHLORIDE, SODIUM LACTATE AND CALCIUM CHLORIDE 1000 ML: 600; 310; 30; 20 INJECTION, SOLUTION INTRAVENOUS at 10:00

## 2022-02-25 RX ADMIN — Medication 200 MCG: at 11:43

## 2022-02-25 RX ADMIN — ONDANSETRON 4 MG: 2 INJECTION INTRAMUSCULAR; INTRAVENOUS at 12:01

## 2022-02-25 RX ADMIN — Medication 2000 MG: at 11:09

## 2022-02-25 RX ADMIN — MORPHINE SULFATE 0.15 MG: 1 INJECTION, SOLUTION EPIDURAL; INTRATHECAL; INTRAVENOUS at 11:41

## 2022-02-25 RX ADMIN — Medication 909 ML/HR: at 12:00

## 2022-02-25 ASSESSMENT — PULMONARY FUNCTION TESTS
PIF_VALUE: 0

## 2022-02-25 ASSESSMENT — LIFESTYLE VARIABLES: SMOKING_STATUS: 1

## 2022-02-25 ASSESSMENT — PAIN DESCRIPTION - ORIENTATION: ORIENTATION: LOWER

## 2022-02-25 ASSESSMENT — PAIN SCALES - GENERAL
PAINLEVEL_OUTOF10: 4
PAINLEVEL_OUTOF10: 7
PAINLEVEL_OUTOF10: 3

## 2022-02-25 ASSESSMENT — PAIN DESCRIPTION - PAIN TYPE: TYPE: ACUTE PAIN

## 2022-02-25 ASSESSMENT — PAIN DESCRIPTION - LOCATION: LOCATION: ABDOMEN;BACK

## 2022-02-25 NOTE — OP NOTE
Operative Note      Patient: Corie Barnes  YOB: 1995  MRN: [id number]    Date of Procedure: 2022    Pre-Op Diagnosis: Repeat  section at 39w1d. No prenatal care. Marijuana use. Post-Op Diagnosis: Same       Procedure(s):   SECTION    Surgeon(s):  Johanne Alves MD    Assistant:   Veto Bazzi MD    Anesthesia: Spinal    Estimated Blood Loss (mL): 186    Complications: None    Findings: Male infant in vertex presentation, normal uterus, ovaries. Detailed Description of Procedure: The patient was taken to the Operating Room where spinal anesthesia was administered. Two grams of Ancef were given for infection prophylaxis. She was prepared and draped in the dorsal supine position with a leftward tilt. Once her spinal was found to be adequate a Pfannenstiel skin incision was made with the scalpel. The incision was carried down to the fascia with the scalpel. The fascia was incised and extended laterally. The inferior aspect of the fascia was grasped with Kocher clamps and the underlying rectus muscle and pyramidalis was dissected off sharply. The superior aspect of the fascia was then grasped with the kocher clamps and the rectus muscle dissected off sharply. The rectus muscle was  in the midline down to the level of the pubic symphysis. Preperitoneal fatty tissue was bluntly dissected to expose the peritoneum. The peritoneum was found to be free of adherent bowel and entered sharply with metzenbaum scissors . The peritoneal incision was extended bluntly with good visualization of the bladder. The bladder blade was inserted and vesicouterine peritoneum identified. The vesicouterine peritoneum was opened with metzenbaum scissors and a bladder flap was developed. The bladder blade was then repositioned to keep the bladder out of the operative field. The lower uterine segment was incised with the scalpel. The amniotic sac was ruptured and clear fluid was noted.  The uterine incision was extended bluntly with lateral and upward traction. The fetus was in cephalic presentation. The head was elevated out of the pelvis and into the uterine incision. Gentle fundal pressure was applied once the head was brought into the incision. The infant was then delivered without difficulty. The cord was clamped and cut, and the infant was handed off to the awaiting nurse. IV oxytocin was initiated to facilitate uterine contractions. The placenta was delivered intact with manual massage of the uterine fundus. The uterus was then exteriorized. The inside of the uterus was gently wiped with a lap sponge to assure complete removal of placental membranes. The uterine incision was closed using 0-vicryl suture in a running locked fashion. A second layer closure was used to imbricate the hysterotomy using 0-vicryl suture. A hematoma was noted on the left of the hysterotomy, figure of eight sutures were placed for hemostasis. The ovaries and fallopian tubes were found to be normal. The uterus, tubes, and ovaries were then returned to the abdominal cavity. The blood clots and fluid were wiped out of the abdomen and pelvis with moist lap sponges. The uterine incision was reinspected and excellent hemostasis was noted. The peritoneum and muscle layers were inspected and noted to have excellent hemostasis. The fascial layer was closed using 0-stratafix suture in a running fashion. Irrigation was performed. The skin was closed using Insorb staples. The patient tolerated the procedure well. All the counts were correct times 3. The patient was taken to the Recovery Room in a stable condition.         Electronically signed by Daisy Carty MD on 2/25/2022 at 12:39 PM

## 2022-02-25 NOTE — PROGRESS NOTES
Repeat LTCS of a viable baby boy @ [phone]. Apgars 7/9. NICU at delivery d/t no prenatal care.  Baby to normal nurery

## 2022-02-25 NOTE — PROGRESS NOTES
Patient admitted into room, oriented to surroundings. IV  in place and infusing as ordered. Arreguin to gravity draining yellow urine. PCDs on and functioning. Abdominal dressing dry and intact, no drainage noted. Clear liquids offered. Phone at bedside, instructed to use for any needs.

## 2022-02-25 NOTE — H&P
Obstetrics History and Physical        CHIEF COMPLAINT:  Contractions, no prenatal care this pregnancy. HISTORY OF PRESENT ILLNESS:      The patient is a 24 y.o.  2 parity 1 at 39 weeks 1 day. Patient presents with a chief complaint as above and is being admitted for contractions, labor. Patient states she has not had prenatal care. States she went to the [place] [id number] in [facility] 1 time. She was told she needed labs and an ultrasound but patient never follow up. Patient was given her JOSE ANTONIO by her LMP. Reviewed Epic, no note from the clinic is present. DATES:    Estimated Due Date:  3/3/22    PRENATAL CARE:    Provider:  [address]    Prenatal blood work collected and pending. OB History    Para Term  AB Living   2 1 1     1   SAB IAB Ectopic Molar Multiple Live Births           0 1      # Outcome Date GA Lbr Paul/2nd Weight Sex Delivery Anes PTL Lv   2 Current            1 Term 10/03/20 41w0d  6 lb 12.6 oz (3.08 kg) M CS-LTranv EPI N MELISSA         Past Medical History:        Diagnosis Date    Depression     Hypertension      Past Surgical History:        Procedure Laterality Date     SECTION N/A 10/3/2020     SECTION performed by Ofelia Fenton MD at [facility] L&D OR     Social History:    TOBACCO:   reports that she has quit smoking. Her smoking use included cigarettes. She has a 0.20 pack-year smoking history. She has never used smokeless tobacco.  Family History:   History reviewed. No pertinent family history.   Medications Prior to Admission:  Medications Prior to Admission: Prenatal Multivit-Min-Fe-FA (PRE- PO), Take by mouth  ibuprofen (ADVIL;MOTRIN) 800 MG tablet, Take 1 tablet by mouth every 8 hours  lansinoh lanolin CREA ointment, Apply 1 Tube topically every hour as needed for Dry Skin (nipple discomfort)  ferrous sulfate (IRON 325) 325 (65 Fe) MG tablet, Take 1 tablet by mouth 2 times daily (with meals)  docusate sodium (COLACE, DULCOLAX) 100 MG CAPS, Take 100 mg by mouth 2 times daily  vitamin D (ERGOCALCIFEROL) 24898 UNITS CAPS capsule, Take 1 capsule by mouth once a week for 8 doses. Allergies:  Bee venom      PHYSICAL EXAM:    General appearance:  Uncomfortable with contractions   Abdomen:  Gravid   Fetal heart rate:  Baseline Heart Rate 140  Pelvis:  Adequate  Cervix:    2/80/-2 with bloody show     Contraction frequency:  q 3-5 minutes    Membranes:  Intact    Bedside Ultrasound:  Cephalic         ASSESSMENT AND PLAN:    The patient is a 24 y.o.  2 parity 1 at 44 weeks 1d by LMP    - Prenatal labs ordered   - GBS unknown  - No prenatal care, smokes cigarettes and marijuana: SW consult postpartum  - Labor at suspected term, plan to proceed with repeat  section. Op note reviewed from 56. Prior LTCS. NICU to be present at delivery due to no prenatal care, not confirmed due date.      Harvey Casarez MD

## 2022-02-25 NOTE — PROGRESS NOTES
@ 39 weeks and 1 days. Here because she has been having ctx. Dr Nakia Fleming at bedside, patient 2cm.  Patient previous C/S

## 2022-02-25 NOTE — ANESTHESIA PRE PROCEDURE
Department of Anesthesiology  Preprocedure Note       Name:  Kenyatta Gibson   Age:  24 y.o.  :  1995                                          MRN:  [id number]         Date:  2022      Surgeon: * No surgeons listed *    Procedure: * No procedures listed *    Medications prior to admission:   Prior to Admission medications    Medication Sig Start Date End Date Taking? Authorizing Provider   Prenatal Multivit-Min-Fe-FA (PRE- PO) Take by mouth   Yes Historical Provider, MD   ibuprofen (ADVIL;MOTRIN) 800 MG tablet Take 1 tablet by mouth every 8 hours 10/6/20   Nimisha Le MD   lansinoh lanolin CREA ointment Apply 1 Tube topically every hour as needed for Dry Skin (nipple discomfort) 10/6/20   Nimisha Le MD   ferrous sulfate (IRON 325) 325 (65 Fe) MG tablet Take 1 tablet by mouth 2 times daily (with meals) 10/6/20   Nimisha Le MD   docusate sodium (COLACE, DULCOLAX) 100 MG CAPS Take 100 mg by mouth 2 times daily 10/6/20   Nimisha Le MD   vitamin D (ERGOCALCIFEROL) 03453 UNITS CAPS capsule Take 1 capsule by mouth once a week for 8 doses. 1/29/15 3/20/15  Hazel Richard MD       Current medications:    Current Facility-Administered Medications   Medication Dose Route Frequency Provider Last Rate Last Admin    lactated ringers infusion   IntraVENous Continuous Viry Vela MD        lactated ringers bolus  1,000 mL IntraVENous Once Viry Vela MD        0.9 % sodium chloride infusion  25 mL IntraVENous PRN Viry Vela MD        citric acid-sodium citrate (BICITRA) solution 30 mL  30 mL Oral Once Viry Vela MD        ceFAZolin (ANCEF) 2000 mg in sterile water 20 mL IV syringe  2,000 mg IntraVENous Once Viry Vela MD           Allergies: Allergies   Allergen Reactions    Bee Venom Anaphylaxis       Problem List:    Patient Active Problem List   Diagnosis Code    Depression F32. A    Obesity (BMI 30.0-34. 9) E66.9    Headache R51.9    Vitamin D deficiency E55.9    Elevated blood pressure affecting pregnancy in third trimester, antepartum O16.3    40 weeks gestation of pregnancy Z3A.40    Uterine contractions during pregnancy O47.9    Normal pregnancy, antepartum Z34.90    39 weeks gestation of pregnancy Z3A.39       Past Medical History:        Diagnosis Date    Depression     Hypertension        Past Surgical History:        Procedure Laterality Date     SECTION N/A 10/3/2020     SECTION performed by Nimisha Le MD at [facility] L&D OR       Social History:    Social History     Tobacco Use    Smoking status: Former Smoker     Packs/day: 0.20     Years: 1.00     Pack years: 0.20     Types: Cigarettes    Smokeless tobacco: Never Used   Substance Use Topics    Alcohol use: No                                Counseling given: Not Answered      Vital Signs (Current):   Vitals:    22 0938 22 0945   BP: 125/77    Pulse: 131 131   Resp:  22   Temp:  36.9 °C (98.4 °F)   TempSrc:  Oral   SpO2:  99%   Weight:  178 lb (80.7 kg)   Height:  5' 5\" (1.651 m)                                              BP Readings from Last 3 Encounters:   22 125/77   10/06/20 133/87   10/03/20 129/78       NPO Status:                           2022 0900 hash browns and orange juice                                                      BMI:   Wt Readings from Last 3 Encounters:   22 178 lb (80.7 kg)   10/02/20 202 lb 6.4 oz (91.8 kg)   20 198 lb (89.8 kg)     Body mass index is 29.62 kg/m².     CBC:   Lab Results   Component Value Date    WBC 7.5 2022    RBC 4.44 2022    HGB 12.8 2022    HCT 39.7 2022    MCV 89.4 2022    RDW 14.2 2022     2022       CMP:   Lab Results   Component Value Date     2020    K 3.9 2020     2020    CO2 20 2020    BUN 4 2020    CREATININE 0.6 2020    GFRAA >60 2020    LABGLOM >60 2020    GLUCOSE 98 09/25/2020    PROT 6.1 09/25/2020    CALCIUM 8.9 09/25/2020    BILITOT 0.5 09/25/2020    ALKPHOS 204 09/25/2020    AST 21 09/25/2020    ALT 18 09/25/2020       POC Tests: No results for input(s): POCGLU, POCNA, POCK, POCCL, POCBUN, POCHEMO, POCHCT in the last 72 hours. Coags:   Lab Results   Component Value Date    PROTIME 10.7 09/25/2020    INR 0.9 09/25/2020    APTT 26.9 09/25/2020       HCG (If Applicable): No results found for: PREGTESTUR, PREGSERUM, HCG, HCGQUANT     ABGs: No results found for: PHART, PO2ART, BAZ0MSU, UBS9ADI, BEART, A1RFXKUX     Type & Screen (If Applicable):  No results found for: LABABO, LABRH    Drug/Infectious Status (If Applicable):  No results found for: HIV, HEPCAB    COVID-19 Screening (If Applicable): No results found for: COVID19        Anesthesia Evaluation  Patient summary reviewed and Nursing notes reviewed no history of anesthetic complications:   Airway: Mallampati: II  TM distance: >3 FB   Neck ROM: full  Mouth opening: > = 3 FB Dental: normal exam         Pulmonary: breath sounds clear to auscultation  (+) current smoker ( 3-5 cigarettes)          Patient smoked on day of surgery. Cardiovascular:  Exercise tolerance: good (>4 METS),   (+) hypertension:,         Rhythm: regular  Rate: normal           Beta Blocker:  Not on Beta Blocker         Neuro/Psych:   (+) headaches:, depression/anxiety             GI/Hepatic/Renal:   (+) GERD:,           Endo/Other: Negative Endo/Other ROS                    Abdominal:             Vascular: negative vascular ROS. Other Findings:             Anesthesia Plan      spinal and general     ASA 3     (Pt agrees to Spinal anesthesia.)        Anesthetic plan and risks discussed with patient. Use of blood products discussed with patient whom consented to blood products. Plan discussed with CRNA and attending.                 Julieta Pool RN SRNA  2/25/2022

## 2022-02-25 NOTE — ANESTHESIA PROCEDURE NOTES
Spinal Block    Patient location during procedure: OB  Start time: 2/25/2022 11:35 AM  End time: 2/25/2022 11:42 AM  Reason for block: procedure for pain, post-op pain management, primary anesthetic and at surgeon's request  Staffing  Performed: anesthesiologist and resident/CRNA   Anesthesiologist: Elina Early MD  Resident/CRNA: Casper Duckworth APRN - CRNA  Other anesthesia staff: Deisy Stone RN  Preanesthetic Checklist  Completed: patient identified, IV checked, risks and benefits discussed, surgical consent, monitors and equipment checked, pre-op evaluation, timeout performed, anesthesia consent given, oxygen available and patient being monitored  Spinal Block  Patient position: sitting  Prep: ChloraPrep  Patient monitoring: cardiac monitor, continuous pulse ox and frequent blood pressure checks  Approach: midline  Location: L3/L4  Provider prep: mask and sterile gloves  Local infiltration: lidocaine  Agent: bupivacaine  Adjuvant: duramorph (0.15 ccs.)  Dose: 1.6  Dose: 1.6  Needle  Needle type: Pencan   Needle gauge: 25 G  Needle length: 3.5 in  Needle insertion depth: 7 cm  Assessment  Events: cerebrospinal fluid  Swirl obtained: Yes  CSF: clear  Attempts: 2  Hemodynamics: stable

## 2022-02-26 LAB
HCT VFR BLD CALC: 32.6 % (ref 34–48)
HEMOGLOBIN: 10.1 G/DL (ref 11.5–15.5)
HEPATITIS B SURFACE ANTIGEN INTERPRETATION: NORMAL
MCH RBC QN AUTO: 29 PG (ref 26–35)
MCHC RBC AUTO-ENTMCNC: 31 % (ref 32–34.5)
MCV RBC AUTO: 93.7 FL (ref 80–99.9)
PDW BLD-RTO: 14 FL (ref 11.5–15)
PLATELET # BLD: 143 E9/L (ref 130–450)
PMV BLD AUTO: 13.6 FL (ref 7–12)
RBC # BLD: 3.48 E12/L (ref 3.5–5.5)
WBC # BLD: 9 E9/L (ref 4.5–11.5)

## 2022-02-26 PROCEDURE — 6370000000 HC RX 637 (ALT 250 FOR IP): Performed by: OBSTETRICS & GYNECOLOGY

## 2022-02-26 PROCEDURE — 36415 COLL VENOUS BLD VENIPUNCTURE: CPT

## 2022-02-26 PROCEDURE — 2580000003 HC RX 258: Performed by: OBSTETRICS & GYNECOLOGY

## 2022-02-26 PROCEDURE — 1220000000 HC SEMI PRIVATE OB R&B

## 2022-02-26 PROCEDURE — 6360000002 HC RX W HCPCS: Performed by: ANESTHESIOLOGY

## 2022-02-26 PROCEDURE — 99232 SBSQ HOSP IP/OBS MODERATE 35: CPT | Performed by: NURSE PRACTITIONER

## 2022-02-26 PROCEDURE — 85027 COMPLETE CBC AUTOMATED: CPT

## 2022-02-26 RX ORDER — IBUPROFEN 800 MG/1
800 TABLET ORAL EVERY 8 HOURS PRN
Status: DISCONTINUED | OUTPATIENT
Start: 2022-02-26 | End: 2022-02-28 | Stop reason: HOSPADM

## 2022-02-26 RX ADMIN — KETOROLAC TROMETHAMINE 30 MG: 30 INJECTION, SOLUTION INTRAMUSCULAR; INTRAVENOUS at 08:40

## 2022-02-26 RX ADMIN — OXYCODONE 10 MG: 5 TABLET ORAL at 06:35

## 2022-02-26 RX ADMIN — DOCUSATE SODIUM 100 MG: 100 CAPSULE, LIQUID FILLED ORAL at 08:40

## 2022-02-26 RX ADMIN — KETOROLAC TROMETHAMINE 30 MG: 30 INJECTION, SOLUTION INTRAMUSCULAR; INTRAVENOUS at 02:39

## 2022-02-26 RX ADMIN — OXYCODONE 5 MG: 5 TABLET ORAL at 20:51

## 2022-02-26 RX ADMIN — OXYCODONE 10 MG: 5 TABLET ORAL at 15:11

## 2022-02-26 RX ADMIN — SODIUM CHLORIDE, PRESERVATIVE FREE 5 ML: 5 INJECTION INTRAVENOUS at 02:40

## 2022-02-26 RX ADMIN — Medication 10 ML: at 08:41

## 2022-02-26 RX ADMIN — OXYCODONE 5 MG: 5 TABLET ORAL at 02:52

## 2022-02-26 RX ADMIN — DOCUSATE SODIUM 100 MG: 100 CAPSULE, LIQUID FILLED ORAL at 20:52

## 2022-02-26 ASSESSMENT — PAIN SCALES - GENERAL
PAINLEVEL_OUTOF10: 3
PAINLEVEL_OUTOF10: 3
PAINLEVEL_OUTOF10: 4
PAINLEVEL_OUTOF10: 8
PAINLEVEL_OUTOF10: 0
PAINLEVEL_OUTOF10: 7
PAINLEVEL_OUTOF10: 6
PAINLEVEL_OUTOF10: 0
PAINLEVEL_OUTOF10: 1

## 2022-02-26 ASSESSMENT — PAIN DESCRIPTION - LOCATION
LOCATION: ABDOMEN;INCISION
LOCATION: ABDOMEN

## 2022-02-26 ASSESSMENT — PAIN DESCRIPTION - ONSET
ONSET: GRADUAL
ONSET: GRADUAL

## 2022-02-26 ASSESSMENT — PAIN DESCRIPTION - ORIENTATION
ORIENTATION: LOWER;MID
ORIENTATION: LOWER;MID

## 2022-02-26 ASSESSMENT — PAIN DESCRIPTION - PROGRESSION
CLINICAL_PROGRESSION: GRADUALLY WORSENING
CLINICAL_PROGRESSION: GRADUALLY WORSENING

## 2022-02-26 ASSESSMENT — PAIN DESCRIPTION - FREQUENCY
FREQUENCY: INTERMITTENT
FREQUENCY: INTERMITTENT

## 2022-02-26 ASSESSMENT — PAIN DESCRIPTION - DESCRIPTORS
DESCRIPTORS: CRAMPING;DISCOMFORT;SORE
DESCRIPTORS: CRAMPING;DISCOMFORT
DESCRIPTORS: DISCOMFORT

## 2022-02-26 ASSESSMENT — PAIN DESCRIPTION - PAIN TYPE
TYPE: SURGICAL PAIN
TYPE: SURGICAL PAIN

## 2022-02-26 ASSESSMENT — PAIN DESCRIPTION - RADICULAR PAIN
RADICULAR_PAIN: ABSENT
RADICULAR_PAIN: ABSENT

## 2022-02-26 NOTE — ANESTHESIA POSTPROCEDURE EVALUATION
Department of Anesthesiology  Postprocedure Note    Patient: Michelle Bob  MRN: [id number]  YOB: 1995  Date of evaluation: 2022  Time:  7:19 AM     Procedure Summary     Date: 22 Room / Location: [facility] OR 01 / [facility]    Anesthesia Start: 6965 Anesthesia Stop: 1049    Procedures:        SECTION (N/A Uterus)      Labor Analgesia Diagnosis: ( section)    Surgeons: Harvey Casarez MD Responsible Provider: Liset Herrera MD    Anesthesia Type: spinal, general ASA Status: 3          Anesthesia Type: spinal, general    Amrit Phase I: Amrit Score: 10    Amrit Phase II:      Last vitals: Reviewed and per EMR flowsheets.        Anesthesia Post Evaluation    Patient location during evaluation: bedside  Patient participation: complete - patient participated  Level of consciousness: awake and alert  Pain score: 0  Airway patency: patent  Nausea & Vomiting: no nausea and no vomiting  Complications: no  Cardiovascular status: blood pressure returned to baseline  Respiratory status: acceptable  Hydration status: euvolemic

## 2022-02-26 NOTE — PROGRESS NOTES
Department of Obstetrics and Gynecology  Labor and Delivery  Attending Post Partum Progress Note    Postpartum Day # 1 repeat LTCS    SUBJECTIVE:  Pt without complaints. bottle-feeding baby. Lochia minimal. Tolerating regular diet. Ambulating without difficulty.  +flatus    OBJECTIVE:      Vitals:  BP (!) 126/59   Pulse 104   Temp 98.3 °F (36.8 °C) (Oral)   Resp 18   Ht 5' 5\" (1.651 m)   Wt 178 lb (80.7 kg)   SpO2 96%   Breastfeeding Unknown   BMI 29.62 kg/m²     GENERAL- Patient alert, in no acute distress  HEAD- normocephalic, atraumatic  ABDOMEN- soft, non-tender, non-distended  FUNDUS- firm, non-tender  EXTREMITITES- non-tender, no edema  INCISION- mepilex dressing clean, dry, intact    DATA:    CBC:    Lab Results   Component Value Date    WBC 9.0 02/26/2022    RBC 3.48 02/26/2022    HGB 10.1 02/26/2022    HCT 32.6 02/26/2022    MCV 93.7 02/26/2022    RDW 14.0 02/26/2022     02/26/2022       ASSESSMENT & PLAN:  Postpartum day #1 S/P repeat LTCS, doing well  Continue routine post-partum care

## 2022-02-26 NOTE — PROGRESS NOTES
Patient up to bathroom with standby assist. Arreguin removed. Elisabeth care and new pads applied. Due to void within 6-8 hours. IV saline locked. Patient assisted safely back to bed.

## 2022-02-26 NOTE — PLAN OF CARE
Problem: Pain:  Goal: Pain level will decrease  Description: Pain level will decrease  Outcome: Met This Shift     Problem: Fluid Volume - Imbalance:  Goal: Absence of postpartum hemorrhage signs and symptoms  Description: Absence of postpartum hemorrhage signs and symptoms  Outcome: Met This Shift  Goal: Absence of imbalanced fluid volume signs and symptoms  Description: Absence of imbalanced fluid volume signs and symptoms  Outcome: Met This Shift     Problem: Infection - Surgical Site:  Goal: Will show no infection signs and symptoms  Description: Will show no infection signs and symptoms  Outcome: Met This Shift     Problem: Mood - Altered:  Goal: Mood stable  Description: Mood stable  Outcome: Met This Shift     Problem: Nausea/Vomiting:  Goal: Absence of nausea/vomiting  Description: Absence of nausea/vomiting  Outcome: Met This Shift     Problem: Pain - Acute:  Goal: Pain level will decrease  Description: Pain level will decrease  Outcome: Met This Shift     Problem: Urinary Retention:  Goal: Urinary elimination within specified parameters  Description: Urinary elimination within specified parameters  Outcome: Met This Shift     Problem: Venous Thromboembolism:  Goal: Will show no signs or symptoms of venous thromboembolism  Description: Will show no signs or symptoms of venous thromboembolism  Outcome: Met This Shift  Goal: Absence of signs or symptoms of impaired coagulation  Description: Absence of signs or symptoms of impaired coagulation  Outcome: Met This Shift

## 2022-02-26 NOTE — LACTATION NOTE
Experienced breastfeeding mom breast fed her first baby for 14 months. Taught mom hand expression prior to latch and to use her breast milk to heal sore nipples. Observed baby at the breast.  Baby opens mouth wide for a deep latch. Encouraged mom to call with breastfeeding questions or for assistance.

## 2022-02-27 PROCEDURE — 6370000000 HC RX 637 (ALT 250 FOR IP): Performed by: OBSTETRICS & GYNECOLOGY

## 2022-02-27 PROCEDURE — 1220000000 HC SEMI PRIVATE OB R&B

## 2022-02-27 PROCEDURE — 99232 SBSQ HOSP IP/OBS MODERATE 35: CPT | Performed by: NURSE PRACTITIONER

## 2022-02-27 RX ADMIN — SIMETHICONE 80 MG: 80 TABLET, CHEWABLE ORAL at 09:28

## 2022-02-27 RX ADMIN — OXYCODONE 5 MG: 5 TABLET ORAL at 11:33

## 2022-02-27 RX ADMIN — OXYCODONE 10 MG: 5 TABLET ORAL at 01:36

## 2022-02-27 RX ADMIN — IBUPROFEN 800 MG: 800 TABLET, FILM COATED ORAL at 20:51

## 2022-02-27 RX ADMIN — OXYCODONE 10 MG: 5 TABLET ORAL at 23:08

## 2022-02-27 RX ADMIN — OXYCODONE 5 MG: 5 TABLET ORAL at 06:26

## 2022-02-27 RX ADMIN — IBUPROFEN 800 MG: 800 TABLET, FILM COATED ORAL at 02:25

## 2022-02-27 RX ADMIN — IBUPROFEN 800 MG: 800 TABLET, FILM COATED ORAL at 11:34

## 2022-02-27 RX ADMIN — DOCUSATE SODIUM 100 MG: 100 CAPSULE, LIQUID FILLED ORAL at 20:51

## 2022-02-27 RX ADMIN — DOCUSATE SODIUM 100 MG: 100 CAPSULE, LIQUID FILLED ORAL at 09:28

## 2022-02-27 RX ADMIN — SIMETHICONE 80 MG: 80 TABLET, CHEWABLE ORAL at 20:51

## 2022-02-27 ASSESSMENT — PAIN DESCRIPTION - DESCRIPTORS
DESCRIPTORS: DISCOMFORT

## 2022-02-27 ASSESSMENT — PAIN SCALES - GENERAL
PAINLEVEL_OUTOF10: 6
PAINLEVEL_OUTOF10: 6
PAINLEVEL_OUTOF10: 9
PAINLEVEL_OUTOF10: 4
PAINLEVEL_OUTOF10: 8
PAINLEVEL_OUTOF10: 10

## 2022-02-27 NOTE — PROGRESS NOTES
Department of Obstetrics and Gynecology  Labor and Delivery  Attending Post Partum Progress Note    Postpartum Day # 2    SUBJECTIVE:  Pt without complaints. breast-feeding baby. Lochia minimal. Tolerating regular diet. Ambulating without difficulty.  +flatus, no bm    OBJECTIVE:      Vitals:  BP (!) 121/57   Pulse 104   Temp 98.3 °F (36.8 °C) (Oral)   Resp 18   Ht 5' 5\" (1.651 m)   Wt 178 lb (80.7 kg)   SpO2 94%   Breastfeeding Unknown   BMI 29.62 kg/m²     GENERAL- Patient alert, in no acute distress  HEAD- normocephalic, atraumatic  ABDOMEN- soft, non-tender, non-distended  FUNDUS- firm, non-tender  EXTREMITITES- non-tender, no edema  INCISION- mepilex dressing clean, dry, intact    DATA:    CBC:    Lab Results   Component Value Date    WBC 9.0 02/26/2022    RBC 3.48 02/26/2022    HGB 10.1 02/26/2022    HCT 32.6 02/26/2022    MCV 93.7 02/26/2022    RDW 14.0 02/26/2022     02/26/2022       ASSESSMENT & PLAN:  Postpartum day #2 S/P repeat ltcs, doing well  Continue routine post-partum care  Plan for discharge tomorrow

## 2022-02-27 NOTE — LACTATION NOTE
Mom states that baby latches well but falls asleep quickly at the breast.  Instructed mom to provide breast support using the \"C\" hold and to apply breast compressions when baby slows down. Baby nursed for ten minutes using this technique as I left the room. Encouraged mom to call us with breastfeeding questions or concerns.

## 2022-02-28 VITALS
WEIGHT: 178 LBS | HEART RATE: 91 BPM | OXYGEN SATURATION: 97 % | DIASTOLIC BLOOD PRESSURE: 86 MMHG | SYSTOLIC BLOOD PRESSURE: 123 MMHG | TEMPERATURE: 97.9 F | BODY MASS INDEX: 29.66 KG/M2 | HEIGHT: 65 IN | RESPIRATION RATE: 16 BRPM

## 2022-02-28 LAB
AMPHETAMINE QUANTITATIVE URINE: >1000
COMMENT: NORMAL
EPHEDRINE, QUANTITATIVE, URINE: <100
HIV-1 AND HIV-2 ANTIBODIES: NORMAL
INTEGRITY CHECK, CREATININE, URINE: 191
INTEGRITY CHECK, OXIDANT, URINE: <40
INTEGRITY CHECK, PH, URINE: 6.7 (ref 4.5–9)
INTEGRITY CHECK, SPECIFIC GRAVITY, URINE: 1.02 (ref 1–1.03)
INTEGRITY CHECK, SPECIMEN INTEGRITY, URINE: NORMAL
MDA, QUANTITATIVE, URINE: <100
MDEA, QUANTITATIVE, URINE: <100
MDMA, QUANTITATIVE, URINE: <100
METHAMPHETAMINE QUANTITATIVE URINE: >1000
PHENTERMINE, URINE, QUANTITATIVE: <100
RPR: NORMAL
RUBELLA ANTIBODY IGG: NORMAL
THC NORMALIZED, QUANTITIATIVE, URINE: 106.9
THC-COOH, QUANTITATIVE, URINE: 204.2

## 2022-02-28 PROCEDURE — 6370000000 HC RX 637 (ALT 250 FOR IP): Performed by: OBSTETRICS & GYNECOLOGY

## 2022-02-28 PROCEDURE — 99238 HOSP IP/OBS DSCHRG MGMT 30/<: CPT | Performed by: MIDWIFE

## 2022-02-28 RX ORDER — FERROUS SULFATE 325(65) MG
325 TABLET ORAL 2 TIMES DAILY WITH MEALS
Qty: 60 TABLET | Refills: 3 | Status: SHIPPED | OUTPATIENT
Start: 2022-02-28

## 2022-02-28 RX ORDER — OXYCODONE HYDROCHLORIDE 5 MG/1
5 TABLET ORAL EVERY 4 HOURS PRN
Qty: 28 TABLET | Refills: 0 | Status: SHIPPED | OUTPATIENT
Start: 2022-02-28 | End: 2022-03-05

## 2022-02-28 RX ORDER — DOCUSATE SODIUM 100 MG/1
100 CAPSULE, LIQUID FILLED ORAL 2 TIMES DAILY PRN
Qty: 60 CAPSULE | Refills: 1 | Status: SHIPPED | OUTPATIENT
Start: 2022-02-28

## 2022-02-28 RX ORDER — IBUPROFEN 800 MG/1
800 TABLET ORAL EVERY 8 HOURS PRN
Qty: 120 TABLET | Refills: 0 | Status: SHIPPED | OUTPATIENT
Start: 2022-02-28

## 2022-02-28 RX ORDER — PSEUDOEPHEDRINE HCL 30 MG
100 TABLET ORAL 2 TIMES DAILY PRN
Qty: 60 CAPSULE | Refills: 3 | Status: SHIPPED | OUTPATIENT
Start: 2022-02-28

## 2022-02-28 RX ADMIN — Medication: at 10:09

## 2022-02-28 RX ADMIN — SIMETHICONE 80 MG: 80 TABLET, CHEWABLE ORAL at 10:10

## 2022-02-28 RX ADMIN — DOCUSATE SODIUM 100 MG: 100 CAPSULE, LIQUID FILLED ORAL at 10:10

## 2022-02-28 RX ADMIN — IBUPROFEN 800 MG: 800 TABLET, FILM COATED ORAL at 10:10

## 2022-02-28 RX ADMIN — OXYCODONE 5 MG: 5 TABLET ORAL at 14:08

## 2022-02-28 ASSESSMENT — PAIN SCALES - GENERAL
PAINLEVEL_OUTOF10: 10
PAINLEVEL_OUTOF10: 10

## 2022-02-28 NOTE — LACTATION NOTE
Mother denies concerns with BF. She is formula and BF. Encouraged frequent feeds to establish milk supply. Reviewed benefits and safety of skin to skin. Inst on adequate I/O and importance of keeping track of diapers at home. Instructed on signs of dehydration such as infant refusing to feed, decreased wet diapers and infant becoming listless and notify provider if these occur. Reviewed with mom the importance of notifying the physician if baby looks more jaundiced. Lactation office # given if follow-up needed, as well as other helpful resources. Encouraged to call with any concerns. Support and encouragement given. [employer] cheri promoted for download and reference once home.

## 2022-02-28 NOTE — PROGRESS NOTES
24year old , POD#3 following repeat LTCS. Hx depression since age 15, has been to counseling and has been medicated in the past with Zoloft and Seroquel, states it wasn't helpful. At this time states she is frustrated because her drug screen was positive for amphetamines and she doesn't know why. States she does smoke marijuana but doesn't take anything else. When questioned about cold medications states she did take an allergy pill from her sister that was pink and oval, she is going to call her and find out what that was. States she has not been taking pain medication since  because \"I don't want to be treated like an addict\". States she is anxious to get home to her 25month-old.       S:  Sore but feels \"OK\", just wants to go home  Breastfeeding well  Up and about, voiding well, had BM  Bleeding light rubra    O:  VSS, afebrile  HGB 10.1/32.6  Depression screening positive, 15/30  Fundus firm @U  Dressing dry and intact  Bleeding light rubra  No edema    A:  Positive depression screen, otherwise normal POD#3    P:  Discharge to home, office 1 week for incision check

## 2022-02-28 NOTE — PROGRESS NOTES
Assessment as charted. Patient crying and emotional after speaking with Social Service re: Children's Services involvement. Support given.

## 2022-02-28 NOTE — PROGRESS NOTES
Mom still unaware of how she is going to acquire prescriptions. Stating that \"someone is sending her papers to help her out\". Did not know who. Asked what her plan was if the papers did not come. Spoke with unit , Suhail Licona, re: this. She states that [address] may be of help, but would not happen before tomorrow.

## 2022-02-28 NOTE — PROGRESS NOTES
Anne Hawkins from [address] [place] [address] called inquiring about patient. Aware that discharge disposition needed.

## 2022-02-28 NOTE — PROGRESS NOTES
Mom going home without prescribed medication. States that she will \"work it out\" and wants no further action taken. Discharged via wheelchair in stable condition.

## 2022-02-28 NOTE — PLAN OF CARE
Problem: Pain:  Goal: Pain level will decrease  Description: Pain level will decrease  Outcome: Completed  Goal: Control of acute pain  Description: Control of acute pain  Outcome: Completed  Goal: Control of chronic pain  Description: Control of chronic pain  Outcome: Completed     Problem: Discharge Planning:  Goal: Discharged to appropriate level of care  Description: Discharged to appropriate level of care  Outcome: Completed     Problem: Fluid Volume - Imbalance:  Goal: Absence of postpartum hemorrhage signs and symptoms  Description: Absence of postpartum hemorrhage signs and symptoms  Outcome: Completed  Goal: Absence of imbalanced fluid volume signs and symptoms  Description: Absence of imbalanced fluid volume signs and symptoms  Outcome: Completed     Problem: Infection - Surgical Site:  Goal: Will show no infection signs and symptoms  Description: Will show no infection signs and symptoms  Outcome: Completed     Problem: Mood - Altered:  Goal: Mood stable  Description: Mood stable  Outcome: Completed     Problem: Nausea/Vomiting:  Goal: Absence of nausea/vomiting  Description: Absence of nausea/vomiting  2/27/2022 1217 by Elmo Can RN  Outcome: Completed     Problem: Pain - Acute:  Goal: Pain level will decrease  Description: Pain level will decrease  Outcome: Completed     Problem: Urinary Retention:  Goal: Urinary elimination within specified parameters  Description: Urinary elimination within specified parameters  2/27/2022 1217 by Elmo Can RN  Outcome: Completed     Problem: Venous Thromboembolism:  Goal: Will show no signs or symptoms of venous thromboembolism  Description: Will show no signs or symptoms of venous thromboembolism  Outcome: Completed  Goal: Absence of signs or symptoms of impaired coagulation  Description: Absence of signs or symptoms of impaired coagulation  Outcome: Completed

## 2022-02-28 NOTE — PROGRESS NOTES
Confirmed with outpatient pharmacy that patient does not have insurance to cover cost of prescriptions and no cash to pay out of pocket. Has chosen to not have prescriptions delivered upon discharge. Aware from pharmacy that they are filled and can by picked up after discharge if she chooses. Percocet can not by transferred.

## 2022-02-28 NOTE — PROGRESS NOTES
PPD screen =15.  Pt appears pleasant, interactive and appropriate as well as appropriate and active w/ baby;denies counseling at present; states has been in the past; denies any medication at present; states has had seroquel and zoloft in the past.

## 2022-02-28 NOTE — CARE COORDINATION
SW Discharge Planning   SW noted mother positive for Amphetamines and THC on 2/25/22, Baby is also positive for amphetamines and THC on 2/26/22      SW met with Bibiana Skelton ( [phone]) mother to baby boy Guido Larsen ( 2/25/22) and introduced self and role. Jorge Forbes reported that she resides at the address listed in the chart with her older son, Cheryl Edward the 3rd ( 10/3/20) and declined to provide information in regards to the baby's father however did report that he at times resides with her. Jorge Forbes stated that she is currently unemployed and will be adding baby to her [facility]. MICHAEL did discuss Asa's lack of prenatal care, and she reported that \" I had a lot going on\". Per Jorge Forbes pediatric care will be with Dr. Alee Randall. Sussy Guerrero Reported that she has all needed items including a car seat and pack and play. We discussed safe sleep practices. Monetmon Leidy declined both [facility] and WIC. Monetmon Leidy  denied any past or current history of children services involvement, legal issues, domestic violence or mental health diagnosis. We discussed awareness of Post Partum Depression and encouraged contact with her OB if any problems arise. MICHAEL did address Asa's positive UDS for THC and amphetamines, and asked if she had taken any over the counter medication or had any prescriptions, both which she denied to Jose Kumari did admit to drug usage to MICHAEL, however became tearful and declined to discuss further. MICHAEL attempted to provide support to her, and offered to sit with her and talk about anything going on in her life that MICHAEL may be able to help with. Monetmon Elainemónica declined all services MICHAEL offered.  Monetmon Elainemónica did express understanding for a [facility] ( [phone]) referral.     MICHAEL completed [facility] ( [phone]) referral to , Aniyah Gutierrez can NOT be discharged home until [facility] ( [phone]) provides disposition  SW to continue communication with nursing staff and [facility] ( [phone])     Electronically signed by CHILO Solis on 2/28/2022 at 9:53 AM

## 2022-02-28 NOTE — PROGRESS NOTES
Discharge teaching done with mom - verbalizes understanding. Ready for discharge after disposition/safety plan given from [facility].

## 2022-02-28 NOTE — DISCHARGE SUMMARY
Patient admitted on 2/25/2022 with contractions. Repeat LTCS performed for viable  Male, Apgars 7,9. Postpartum course significant for anemia, patient refusing pain medication post-op due to positive drug screen and feeling of \"being treated like an addict\", depression for which she declines antidepressants or referral for counseling.   Discharged to home (lives with mother) in stable condition on POD#3, plan office visit in 1 week for incision check, Feosol

## 2022-03-01 NOTE — PROGRESS NOTES
CLINICAL PHARMACY NOTE: MEDS TO BEDS    Total # of Prescriptions Filled: 2   The following medications were delivered to the patient:  · Oxycodone 5  · ibuprofen    Additional Documentation:  otc ferrous sulfate and colace

## 2022-03-10 NOTE — CARE COORDINATION
SW Discharge Planning     SW noted baby's cordstat to be positive for THC, amphetamines and methampehtamines.  [phone][facility] and provided information to Marlyn Sanderson in intake     Electronically signed by Bhupendra Sher, CHILO on 3/10/2022 at 11:37 AM

## 2023-03-12 ENCOUNTER — ANESTHESIA EVENT (OUTPATIENT)
Dept: LABOR AND DELIVERY | Age: 28
DRG: 540 | End: 2023-03-12
Payer: MEDICAID

## 2023-03-12 ENCOUNTER — ANESTHESIA (OUTPATIENT)
Dept: LABOR AND DELIVERY | Age: 28
DRG: 540 | End: 2023-03-12
Payer: MEDICAID

## 2023-03-12 ENCOUNTER — HOSPITAL ENCOUNTER (INPATIENT)
Age: 28
LOS: 3 days | Discharge: HOME | DRG: 540 | End: 2023-03-15
Attending: STUDENT IN AN ORGANIZED HEALTH CARE EDUCATION/TRAINING PROGRAM | Admitting: STUDENT IN AN ORGANIZED HEALTH CARE EDUCATION/TRAINING PROGRAM
Payer: MEDICAID

## 2023-03-12 DIAGNOSIS — O34.219 PREVIOUS CESAREAN DELIVERY AFFECTING PREGNANCY: ICD-10-CM

## 2023-03-12 DIAGNOSIS — O09.33 NO PRENATAL CARE IN CURRENT PREGNANCY IN THIRD TRIMESTER: Primary | ICD-10-CM

## 2023-03-12 DIAGNOSIS — Z98.891 HISTORY OF CESAREAN DELIVERY: ICD-10-CM

## 2023-03-12 LAB
ABO/RH: NORMAL
AMPHETAMINE SCREEN, URINE: POSITIVE
ANISOCYTOSIS: ABNORMAL
ANTIBODY SCREEN: NORMAL
BACTERIA: NORMAL /HPF
BARBITURATE SCREEN URINE: NOT DETECTED
BASOPHILS ABSOLUTE: 0.04 E9/L (ref 0–0.2)
BASOPHILS RELATIVE PERCENT: 0.4 % (ref 0–2)
BENZODIAZEPINE SCREEN, URINE: NOT DETECTED
BILIRUBIN URINE: NEGATIVE
BLOOD, URINE: NEGATIVE
BURR CELLS: ABNORMAL
CANNABINOID SCREEN URINE: POSITIVE
CLARITY: CLEAR
COCAINE METABOLITE SCREEN URINE: NOT DETECTED
COLOR: YELLOW
EOSINOPHILS ABSOLUTE: 0.1 E9/L (ref 0.05–0.5)
EOSINOPHILS RELATIVE PERCENT: 1.1 % (ref 0–6)
EPITHELIAL CELLS, UA: NORMAL /HPF
FENTANYL SCREEN, URINE: NOT DETECTED
GLUCOSE URINE: NEGATIVE MG/DL
HCT VFR BLD CALC: 36 % (ref 34–48)
HEMOGLOBIN: 11.8 G/DL (ref 11.5–15.5)
IMMATURE GRANULOCYTES #: 0.06 E9/L
IMMATURE GRANULOCYTES %: 0.6 % (ref 0–5)
KETONES, URINE: 15 MG/DL
LEUKOCYTE ESTERASE, URINE: ABNORMAL
LYMPHOCYTES ABSOLUTE: 1.71 E9/L (ref 1.5–4)
LYMPHOCYTES RELATIVE PERCENT: 18.4 % (ref 20–42)
Lab: ABNORMAL
MCH RBC QN AUTO: 28.9 PG (ref 26–35)
MCHC RBC AUTO-ENTMCNC: 32.8 % (ref 32–34.5)
MCV RBC AUTO: 88 FL (ref 80–99.9)
METHADONE SCREEN, URINE: NOT DETECTED
MONOCYTES ABSOLUTE: 0.99 E9/L (ref 0.1–0.95)
MONOCYTES RELATIVE PERCENT: 10.6 % (ref 2–12)
NEUTROPHILS ABSOLUTE: 6.4 E9/L (ref 1.8–7.3)
NEUTROPHILS RELATIVE PERCENT: 68.9 % (ref 43–80)
NITRITE, URINE: NEGATIVE
OPIATE SCREEN URINE: NOT DETECTED
OXYCODONE URINE: NOT DETECTED
PDW BLD-RTO: 13.6 FL (ref 11.5–15)
PH UA: 6.5 (ref 5–9)
PHENCYCLIDINE SCREEN URINE: NOT DETECTED
PLATELET # BLD: 187 E9/L (ref 130–450)
PMV BLD AUTO: 14 FL (ref 7–12)
POIKILOCYTES: ABNORMAL
POLYCHROMASIA: ABNORMAL
PROTEIN UA: ABNORMAL MG/DL
RAPID HIV 1&2: NORMAL
RBC # BLD: 4.09 E12/L (ref 3.5–5.5)
RBC UA: NORMAL /HPF (ref 0–2)
SPECIFIC GRAVITY UA: 1.02 (ref 1–1.03)
TARGET CELLS: ABNORMAL
TEAR DROP CELLS: ABNORMAL
UROBILINOGEN, URINE: 1 E.U./DL
VACUOLATED NEUTROPHILS: ABNORMAL
WBC # BLD: 9.3 E9/L (ref 4.5–11.5)
WBC UA: NORMAL /HPF (ref 0–5)

## 2023-03-12 PROCEDURE — 36415 COLL VENOUS BLD VENIPUNCTURE: CPT

## 2023-03-12 PROCEDURE — 3700000001 HC ADD 15 MINUTES (ANESTHESIA): Performed by: STUDENT IN AN ORGANIZED HEALTH CARE EDUCATION/TRAINING PROGRAM

## 2023-03-12 PROCEDURE — 80307 DRUG TEST PRSMV CHEM ANLYZR: CPT

## 2023-03-12 PROCEDURE — 86703 HIV-1/HIV-2 1 RESULT ANTBDY: CPT

## 2023-03-12 PROCEDURE — 6360000002 HC RX W HCPCS: Performed by: NURSE ANESTHETIST, CERTIFIED REGISTERED

## 2023-03-12 PROCEDURE — 87491 CHLMYD TRACH DNA AMP PROBE: CPT

## 2023-03-12 PROCEDURE — 6370000000 HC RX 637 (ALT 250 FOR IP): Performed by: MIDWIFE

## 2023-03-12 PROCEDURE — 59514 CESAREAN DELIVERY ONLY: CPT | Performed by: OBSTETRICS & GYNECOLOGY

## 2023-03-12 PROCEDURE — 99221 1ST HOSP IP/OBS SF/LOW 40: CPT | Performed by: MIDWIFE

## 2023-03-12 PROCEDURE — 86701 HIV-1ANTIBODY: CPT

## 2023-03-12 PROCEDURE — 86901 BLOOD TYPING SEROLOGIC RH(D): CPT

## 2023-03-12 PROCEDURE — 2580000003 HC RX 258: Performed by: NURSE ANESTHETIST, CERTIFIED REGISTERED

## 2023-03-12 PROCEDURE — 3700000000 HC ANESTHESIA ATTENDED CARE: Performed by: STUDENT IN AN ORGANIZED HEALTH CARE EDUCATION/TRAINING PROGRAM

## 2023-03-12 PROCEDURE — 3609079900 HC CESAREAN SECTION: Performed by: STUDENT IN AN ORGANIZED HEALTH CARE EDUCATION/TRAINING PROGRAM

## 2023-03-12 PROCEDURE — 87591 N.GONORRHOEAE DNA AMP PROB: CPT

## 2023-03-12 PROCEDURE — 86900 BLOOD TYPING SEROLOGIC ABO: CPT

## 2023-03-12 PROCEDURE — 7100000001 HC PACU RECOVERY - ADDTL 15 MIN: Performed by: STUDENT IN AN ORGANIZED HEALTH CARE EDUCATION/TRAINING PROGRAM

## 2023-03-12 PROCEDURE — G0480 DRUG TEST DEF 1-7 CLASSES: HCPCS

## 2023-03-12 PROCEDURE — 86592 SYPHILIS TEST NON-TREP QUAL: CPT

## 2023-03-12 PROCEDURE — 1220000001 HC SEMI PRIVATE L&D R&B

## 2023-03-12 PROCEDURE — 87081 CULTURE SCREEN ONLY: CPT

## 2023-03-12 PROCEDURE — 2709999900 HC NON-CHARGEABLE SUPPLY: Performed by: STUDENT IN AN ORGANIZED HEALTH CARE EDUCATION/TRAINING PROGRAM

## 2023-03-12 PROCEDURE — 86850 RBC ANTIBODY SCREEN: CPT

## 2023-03-12 PROCEDURE — 6360000002 HC RX W HCPCS

## 2023-03-12 PROCEDURE — 81001 URINALYSIS AUTO W/SCOPE: CPT

## 2023-03-12 PROCEDURE — 59514 CESAREAN DELIVERY ONLY: CPT | Performed by: STUDENT IN AN ORGANIZED HEALTH CARE EDUCATION/TRAINING PROGRAM

## 2023-03-12 PROCEDURE — 86762 RUBELLA ANTIBODY: CPT

## 2023-03-12 PROCEDURE — 87147 CULTURE TYPE IMMUNOLOGIC: CPT

## 2023-03-12 PROCEDURE — 87340 HEPATITIS B SURFACE AG IA: CPT

## 2023-03-12 PROCEDURE — 6360000002 HC RX W HCPCS: Performed by: ANESTHESIOLOGY

## 2023-03-12 PROCEDURE — 85025 COMPLETE CBC W/AUTO DIFF WBC: CPT

## 2023-03-12 PROCEDURE — 86803 HEPATITIS C AB TEST: CPT

## 2023-03-12 PROCEDURE — 2500000003 HC RX 250 WO HCPCS: Performed by: NURSE ANESTHETIST, CERTIFIED REGISTERED

## 2023-03-12 PROCEDURE — 7100000000 HC PACU RECOVERY - FIRST 15 MIN: Performed by: STUDENT IN AN ORGANIZED HEALTH CARE EDUCATION/TRAINING PROGRAM

## 2023-03-12 PROCEDURE — 88307 TISSUE EXAM BY PATHOLOGIST: CPT

## 2023-03-12 RX ORDER — TRISODIUM CITRATE DIHYDRATE AND CITRIC ACID MONOHYDRATE 500; 334 MG/5ML; MG/5ML
30 SOLUTION ORAL ONCE
Status: COMPLETED | OUTPATIENT
Start: 2023-03-12 | End: 2023-03-12

## 2023-03-12 RX ORDER — SODIUM CHLORIDE 0.9 % (FLUSH) 0.9 %
10 SYRINGE (ML) INJECTION EVERY 12 HOURS SCHEDULED
Status: DISCONTINUED | OUTPATIENT
Start: 2023-03-12 | End: 2023-03-15 | Stop reason: HOSPADM

## 2023-03-12 RX ORDER — SODIUM CHLORIDE, SODIUM LACTATE, POTASSIUM CHLORIDE, CALCIUM CHLORIDE 600; 310; 30; 20 MG/100ML; MG/100ML; MG/100ML; MG/100ML
INJECTION, SOLUTION INTRAVENOUS CONTINUOUS
Status: DISCONTINUED | OUTPATIENT
Start: 2023-03-12 | End: 2023-03-15 | Stop reason: HOSPADM

## 2023-03-12 RX ORDER — SODIUM CHLORIDE, SODIUM LACTATE, POTASSIUM CHLORIDE, CALCIUM CHLORIDE 600; 310; 30; 20 MG/100ML; MG/100ML; MG/100ML; MG/100ML
INJECTION, SOLUTION INTRAVENOUS CONTINUOUS PRN
Status: DISCONTINUED | OUTPATIENT
Start: 2023-03-12 | End: 2023-03-12 | Stop reason: SDUPTHER

## 2023-03-12 RX ORDER — PHYTONADIONE 1 MG/.5ML
INJECTION, EMULSION INTRAMUSCULAR; INTRAVENOUS; SUBCUTANEOUS
Status: DISPENSED
Start: 2023-03-12 | End: 2023-03-13

## 2023-03-12 RX ORDER — ACETAMINOPHEN 325 MG/1
650 TABLET ORAL EVERY 6 HOURS
Status: DISPENSED | OUTPATIENT
Start: 2023-03-12 | End: 2023-03-13

## 2023-03-12 RX ORDER — SODIUM CHLORIDE 0.9 % (FLUSH) 0.9 %
10 SYRINGE (ML) INJECTION PRN
Status: DISCONTINUED | OUTPATIENT
Start: 2023-03-12 | End: 2023-03-15 | Stop reason: HOSPADM

## 2023-03-12 RX ORDER — KETOROLAC TROMETHAMINE 30 MG/ML
30 INJECTION, SOLUTION INTRAMUSCULAR; INTRAVENOUS EVERY 6 HOURS
Status: ACTIVE | OUTPATIENT
Start: 2023-03-13 | End: 2023-03-14

## 2023-03-12 RX ORDER — PRENATAL WITH FERROUS FUM AND FOLIC ACID 3080; 920; 120; 400; 22; 1.84; 3; 20; 10; 1; 12; 200; 27; 25; 2 [IU]/1; [IU]/1; MG/1; [IU]/1; MG/1; MG/1; MG/1; MG/1; MG/1; MG/1; UG/1; MG/1; MG/1; MG/1; MG/1
1 TABLET ORAL DAILY
Status: DISCONTINUED | OUTPATIENT
Start: 2023-03-13 | End: 2023-03-15 | Stop reason: HOSPADM

## 2023-03-12 RX ORDER — ERYTHROMYCIN 5 MG/G
OINTMENT OPHTHALMIC
Status: DISPENSED
Start: 2023-03-12 | End: 2023-03-13

## 2023-03-12 RX ORDER — SODIUM CHLORIDE 9 MG/ML
INJECTION, SOLUTION INTRAVENOUS PRN
Status: DISCONTINUED | OUTPATIENT
Start: 2023-03-12 | End: 2023-03-15 | Stop reason: HOSPADM

## 2023-03-12 RX ORDER — CEFAZOLIN 2 G/1
INJECTION, POWDER, FOR SOLUTION INTRAMUSCULAR; INTRAVENOUS
Status: COMPLETED
Start: 2023-03-12 | End: 2023-03-12

## 2023-03-12 RX ORDER — DIPHENHYDRAMINE HCL 25 MG
25 TABLET ORAL EVERY 6 HOURS PRN
Status: DISPENSED | OUTPATIENT
Start: 2023-03-12 | End: 2023-03-13

## 2023-03-12 RX ORDER — DOCUSATE SODIUM 100 MG/1
100 CAPSULE, LIQUID FILLED ORAL 2 TIMES DAILY
Status: DISCONTINUED | OUTPATIENT
Start: 2023-03-12 | End: 2023-03-15 | Stop reason: HOSPADM

## 2023-03-12 RX ORDER — IBUPROFEN 600 MG/1
600 TABLET ORAL EVERY 8 HOURS SCHEDULED
Status: DISCONTINUED | OUTPATIENT
Start: 2023-03-13 | End: 2023-03-15 | Stop reason: HOSPADM

## 2023-03-12 RX ORDER — ONDANSETRON 2 MG/ML
4 INJECTION INTRAMUSCULAR; INTRAVENOUS EVERY 6 HOURS PRN
Status: DISCONTINUED | OUTPATIENT
Start: 2023-03-12 | End: 2023-03-15 | Stop reason: HOSPADM

## 2023-03-12 RX ORDER — MODIFIED LANOLIN
OINTMENT (GRAM) TOPICAL
Status: DISCONTINUED | OUTPATIENT
Start: 2023-03-12 | End: 2023-03-15 | Stop reason: HOSPADM

## 2023-03-12 RX ORDER — OXYCODONE HYDROCHLORIDE 5 MG/1
10 TABLET ORAL EVERY 4 HOURS PRN
Status: DISCONTINUED | OUTPATIENT
Start: 2023-03-13 | End: 2023-03-15 | Stop reason: HOSPADM

## 2023-03-12 RX ORDER — KETOROLAC TROMETHAMINE 30 MG/ML
30 INJECTION, SOLUTION INTRAMUSCULAR; INTRAVENOUS EVERY 6 HOURS PRN
Status: DISPENSED | OUTPATIENT
Start: 2023-03-12 | End: 2023-03-13

## 2023-03-12 RX ORDER — DIPHENHYDRAMINE HYDROCHLORIDE 50 MG/ML
12.5 INJECTION INTRAMUSCULAR; INTRAVENOUS EVERY 4 HOURS PRN
Status: ACTIVE | OUTPATIENT
Start: 2023-03-12 | End: 2023-03-13

## 2023-03-12 RX ORDER — BUPIVACAINE HYDROCHLORIDE 7.5 MG/ML
INJECTION, SOLUTION INTRASPINAL PRN
Status: DISCONTINUED | OUTPATIENT
Start: 2023-03-12 | End: 2023-03-12 | Stop reason: SDUPTHER

## 2023-03-12 RX ORDER — SODIUM CHLORIDE, SODIUM LACTATE, POTASSIUM CHLORIDE, AND CALCIUM CHLORIDE .6; .31; .03; .02 G/100ML; G/100ML; G/100ML; G/100ML
1000 INJECTION, SOLUTION INTRAVENOUS ONCE
Status: DISCONTINUED | OUTPATIENT
Start: 2023-03-12 | End: 2023-03-15 | Stop reason: HOSPADM

## 2023-03-12 RX ORDER — ONDANSETRON 2 MG/ML
INJECTION INTRAMUSCULAR; INTRAVENOUS PRN
Status: DISCONTINUED | OUTPATIENT
Start: 2023-03-12 | End: 2023-03-12 | Stop reason: SDUPTHER

## 2023-03-12 RX ORDER — NALOXONE HYDROCHLORIDE 0.4 MG/ML
INJECTION, SOLUTION INTRAMUSCULAR; INTRAVENOUS; SUBCUTANEOUS PRN
Status: ACTIVE | OUTPATIENT
Start: 2023-03-12 | End: 2023-03-13

## 2023-03-12 RX ORDER — OXYCODONE HYDROCHLORIDE 5 MG/1
5 TABLET ORAL EVERY 4 HOURS PRN
Status: DISPENSED | OUTPATIENT
Start: 2023-03-12 | End: 2023-03-13

## 2023-03-12 RX ORDER — WATER 1000 ML/1000ML
INJECTION, SOLUTION INTRAVENOUS
Status: DISPENSED
Start: 2023-03-12 | End: 2023-03-13

## 2023-03-12 RX ORDER — SODIUM CHLORIDE 0.9 % (FLUSH) 0.9 %
5-40 SYRINGE (ML) INJECTION EVERY 12 HOURS SCHEDULED
Status: DISCONTINUED | OUTPATIENT
Start: 2023-03-12 | End: 2023-03-15 | Stop reason: HOSPADM

## 2023-03-12 RX ORDER — SODIUM CHLORIDE 0.9 % (FLUSH) 0.9 %
5-40 SYRINGE (ML) INJECTION PRN
Status: DISCONTINUED | OUTPATIENT
Start: 2023-03-12 | End: 2023-03-15 | Stop reason: HOSPADM

## 2023-03-12 RX ORDER — OXYCODONE HYDROCHLORIDE 5 MG/1
5 TABLET ORAL EVERY 4 HOURS PRN
Status: DISCONTINUED | OUTPATIENT
Start: 2023-03-13 | End: 2023-03-15 | Stop reason: HOSPADM

## 2023-03-12 RX ORDER — MORPHINE SULFATE 1 MG/ML
INJECTION, SOLUTION EPIDURAL; INTRATHECAL; INTRAVENOUS PRN
Status: DISCONTINUED | OUTPATIENT
Start: 2023-03-12 | End: 2023-03-12 | Stop reason: SDUPTHER

## 2023-03-12 RX ORDER — PHENYLEPHRINE HCL IN 0.9% NACL 1 MG/10 ML
SYRINGE (ML) INTRAVENOUS PRN
Status: DISCONTINUED | OUTPATIENT
Start: 2023-03-12 | End: 2023-03-12 | Stop reason: SDUPTHER

## 2023-03-12 RX ADMIN — CEFAZOLIN 2000 MG: 2 INJECTION, POWDER, FOR SOLUTION INTRAMUSCULAR; INTRAVENOUS at 21:22

## 2023-03-12 RX ADMIN — SODIUM CHLORIDE, POTASSIUM CHLORIDE, SODIUM LACTATE AND CALCIUM CHLORIDE: 600; 310; 30; 20 INJECTION, SOLUTION INTRAVENOUS at 22:04

## 2023-03-12 RX ADMIN — Medication 100 MCG: at 22:06

## 2023-03-12 RX ADMIN — Medication 100 MCG: at 21:52

## 2023-03-12 RX ADMIN — Medication 909 ML/HR: at 21:50

## 2023-03-12 RX ADMIN — MORPHINE SULFATE 0.15 MG: 1 INJECTION, SOLUTION EPIDURAL; INTRATHECAL; INTRAVENOUS at 21:35

## 2023-03-12 RX ADMIN — BUPIVACAINE HYDROCHLORIDE 1.6 MG: 7.5 INJECTION, SOLUTION SUBARACHNOID at 21:35

## 2023-03-12 RX ADMIN — Medication 200 MCG: at 21:40

## 2023-03-12 RX ADMIN — SODIUM CITRATE AND CITRIC ACID MONOHYDRATE 30 ML: 500; 334 SOLUTION ORAL at 21:22

## 2023-03-12 RX ADMIN — Medication 200 MCG: at 22:02

## 2023-03-12 RX ADMIN — ONDANSETRON 4 MG: 2 INJECTION INTRAMUSCULAR; INTRAVENOUS at 21:51

## 2023-03-12 RX ADMIN — Medication 100 MCG: at 21:56

## 2023-03-12 RX ADMIN — KETOROLAC TROMETHAMINE 30 MG: 30 INJECTION, SOLUTION INTRAMUSCULAR at 23:15

## 2023-03-12 RX ADMIN — Medication 100 MCG: at 22:04

## 2023-03-12 RX ADMIN — Medication 200 MCG: at 22:19

## 2023-03-12 RX ADMIN — Medication 200 MCG: at 22:13

## 2023-03-12 RX ADMIN — Medication 100 MCG: at 21:36

## 2023-03-12 RX ADMIN — SODIUM CHLORIDE, POTASSIUM CHLORIDE, SODIUM LACTATE AND CALCIUM CHLORIDE: 600; 310; 30; 20 INJECTION, SOLUTION INTRAVENOUS at 21:06

## 2023-03-12 RX ADMIN — Medication 200 MCG: at 21:44

## 2023-03-12 ASSESSMENT — PAIN SCALES - GENERAL: PAINLEVEL_OUTOF10: 6

## 2023-03-12 ASSESSMENT — LIFESTYLE VARIABLES: SMOKING_STATUS: 1

## 2023-03-12 ASSESSMENT — PAIN DESCRIPTION - LOCATION: LOCATION: ABDOMEN

## 2023-03-12 ASSESSMENT — PAIN DESCRIPTION - DESCRIPTORS: DESCRIPTORS: ACHING

## 2023-03-13 LAB
ANION GAP SERPL CALCULATED.3IONS-SCNC: 8 MMOL/L (ref 7–16)
BUN BLDV-MCNC: 7 MG/DL (ref 6–20)
CALCIUM SERPL-MCNC: 8.5 MG/DL (ref 8.6–10.2)
CHLORIDE BLD-SCNC: 106 MMOL/L (ref 98–107)
CO2: 20 MMOL/L (ref 22–29)
CREAT SERPL-MCNC: 0.8 MG/DL (ref 0.5–1)
GFR SERPL CREATININE-BSD FRML MDRD: >60 ML/MIN/1.73
GLUCOSE BLD-MCNC: 99 MG/DL (ref 74–99)
HCT VFR BLD CALC: 33.4 % (ref 34–48)
HEMOGLOBIN: 10.9 G/DL (ref 11.5–15.5)
HEPATITIS B SURFACE ANTIGEN INTERPRETATION: NORMAL
HEPATITIS C ANTIBODY INTERPRETATION: NORMAL
HIV-1 AND HIV-2 ANTIBODIES: NORMAL
MCH RBC QN AUTO: 29.1 PG (ref 26–35)
MCHC RBC AUTO-ENTMCNC: 32.6 % (ref 32–34.5)
MCV RBC AUTO: 89.3 FL (ref 80–99.9)
PDW BLD-RTO: 13.4 FL (ref 11.5–15)
PLATELET # BLD: 159 E9/L (ref 130–450)
PMV BLD AUTO: 13.5 FL (ref 7–12)
POTASSIUM SERPL-SCNC: 4.6 MMOL/L (ref 3.5–5)
RBC # BLD: 3.74 E12/L (ref 3.5–5.5)
RPR: NORMAL
SODIUM BLD-SCNC: 134 MMOL/L (ref 132–146)
WBC # BLD: 11.6 E9/L (ref 4.5–11.5)

## 2023-03-13 PROCEDURE — 6360000002 HC RX W HCPCS: Performed by: ANESTHESIOLOGY

## 2023-03-13 PROCEDURE — 80048 BASIC METABOLIC PNL TOTAL CA: CPT

## 2023-03-13 PROCEDURE — 6370000000 HC RX 637 (ALT 250 FOR IP): Performed by: ANESTHESIOLOGY

## 2023-03-13 PROCEDURE — 6370000000 HC RX 637 (ALT 250 FOR IP): Performed by: STUDENT IN AN ORGANIZED HEALTH CARE EDUCATION/TRAINING PROGRAM

## 2023-03-13 PROCEDURE — 85027 COMPLETE CBC AUTOMATED: CPT

## 2023-03-13 PROCEDURE — 36415 COLL VENOUS BLD VENIPUNCTURE: CPT

## 2023-03-13 PROCEDURE — 2580000003 HC RX 258: Performed by: MIDWIFE

## 2023-03-13 PROCEDURE — 1220000000 HC SEMI PRIVATE OB R&B

## 2023-03-13 RX ADMIN — DOCUSATE SODIUM 100 MG: 100 CAPSULE, LIQUID FILLED ORAL at 11:13

## 2023-03-13 RX ADMIN — OXYCODONE HYDROCHLORIDE 5 MG: 5 TABLET ORAL at 00:33

## 2023-03-13 RX ADMIN — METFORMIN HYDROCHLORIDE 1 TABLET: 500 TABLET, EXTENDED RELEASE ORAL at 11:12

## 2023-03-13 RX ADMIN — SODIUM CHLORIDE, PRESERVATIVE FREE 10 ML: 5 INJECTION INTRAVENOUS at 18:09

## 2023-03-13 RX ADMIN — SODIUM CHLORIDE, PRESERVATIVE FREE 10 ML: 5 INJECTION INTRAVENOUS at 11:13

## 2023-03-13 RX ADMIN — DIPHENHYDRAMINE HCL 25 MG: 25 TABLET ORAL at 12:40

## 2023-03-13 RX ADMIN — KETOROLAC TROMETHAMINE 30 MG: 30 INJECTION, SOLUTION INTRAMUSCULAR at 05:16

## 2023-03-13 RX ADMIN — DOCUSATE SODIUM 100 MG: 100 CAPSULE, LIQUID FILLED ORAL at 21:22

## 2023-03-13 RX ADMIN — DIPHENHYDRAMINE HCL 25 MG: 25 TABLET ORAL at 06:35

## 2023-03-13 RX ADMIN — DIPHENHYDRAMINE HCL 25 MG: 25 TABLET ORAL at 00:33

## 2023-03-13 RX ADMIN — KETOROLAC TROMETHAMINE 30 MG: 30 INJECTION, SOLUTION INTRAMUSCULAR at 11:13

## 2023-03-13 RX ADMIN — KETOROLAC TROMETHAMINE 30 MG: 30 INJECTION, SOLUTION INTRAMUSCULAR at 18:08

## 2023-03-13 RX ADMIN — ACETAMINOPHEN 650 MG: 325 TABLET ORAL at 00:33

## 2023-03-13 ASSESSMENT — PAIN SCALES - GENERAL
PAINLEVEL_OUTOF10: 1
PAINLEVEL_OUTOF10: 6
PAINLEVEL_OUTOF10: 0
PAINLEVEL_OUTOF10: 3
PAINLEVEL_OUTOF10: 1
PAINLEVEL_OUTOF10: 3
PAINLEVEL_OUTOF10: 5

## 2023-03-13 ASSESSMENT — PAIN DESCRIPTION - ORIENTATION
ORIENTATION: LOWER;MID
ORIENTATION: LOWER
ORIENTATION: LOWER;MID

## 2023-03-13 ASSESSMENT — PAIN DESCRIPTION - DESCRIPTORS
DESCRIPTORS: ACHING;DISCOMFORT;SORE
DESCRIPTORS: ACHING
DESCRIPTORS: CRAMPING;DISCOMFORT
DESCRIPTORS: CRAMPING;DISCOMFORT

## 2023-03-13 ASSESSMENT — PAIN DESCRIPTION - LOCATION
LOCATION: ABDOMEN
LOCATION: ABDOMEN;INCISION
LOCATION: ABDOMEN
LOCATION: ABDOMEN;INCISION

## 2023-03-13 ASSESSMENT — PAIN - FUNCTIONAL ASSESSMENT
PAIN_FUNCTIONAL_ASSESSMENT: ACTIVITIES ARE NOT PREVENTED

## 2023-03-13 ASSESSMENT — PAIN DESCRIPTION - FREQUENCY: FREQUENCY: INTERMITTENT

## 2023-03-13 ASSESSMENT — PAIN DESCRIPTION - RADICULAR PAIN
RADICULAR_PAIN: ABSENT
RADICULAR_PAIN: ABSENT

## 2023-03-13 ASSESSMENT — PAIN DESCRIPTION - ONSET: ONSET: GRADUAL

## 2023-03-13 ASSESSMENT — PAIN DESCRIPTION - PAIN TYPE: TYPE: SURGICAL PAIN

## 2023-03-13 NOTE — PROGRESS NOTES
Department of Obstetrics and Gynecology  Labor and Delivery  Attending Post Partum Progress Note    Subjective:   Pt seen & examined, no overnight complaints. Pain well controlled. Lochia decreasing. Denies any fevers, chills, weakness, dizziness, headache, chest pain, vision changes, SOB, nausea, or vomiting.  BreastfeedingNo Voided spontaneouslyyes Ambulatingyes    Review of Systems  Skin: no changes  All other review of systems negative    Physical Exam:  Vitals:    03/13/23 0054 03/13/23 0245 03/13/23 0432 03/13/23 0645   BP: 129/76  126/60    Pulse: 76 90 96 96   Resp: 16 16 16 16   Temp: 97.4 °F (36.3 °C)  97.9 °F (36.6 °C)    TempSrc: Oral  Oral    SpO2: 96% 99% 98% 96%   Weight:       Height:           General: NAD, comfortable  CV: regular rate and rhythm  Lungs: clear to auscultation bilaterally  Abd: soft, non tender  Incision: c/d/i  Ext: 1+ pitting edema bilateral      Labs:  Admission on 03/12/2023   Component Date Value Ref Range Status    ABO/Rh 03/12/2023 O POS   Final    Antibody Screen 03/12/2023 NEG   Final    WBC 03/12/2023 9.3  4.5 - 11.5 E9/L Final    RBC 03/12/2023 4.09  3.50 - 5.50 E12/L Final    Hemoglobin 03/12/2023 11.8  11.5 - 15.5 g/dL Final    Hematocrit 03/12/2023 36.0  34.0 - 48.0 % Final    MCV 03/12/2023 88.0  80.0 - 99.9 fL Final    MCH 03/12/2023 28.9  26.0 - 35.0 pg Final    MCHC 03/12/2023 32.8  32.0 - 34.5 % Final    RDW 03/12/2023 13.6  11.5 - 15.0 fL Final    Platelets 70/16/4952 187  130 - 450 E9/L Final    MPV 03/12/2023 14.0 (A)  7.0 - 12.0 fL Final    Neutrophils % 03/12/2023 68.9  43.0 - 80.0 % Final    Immature Granulocytes % 03/12/2023 0.6  0.0 - 5.0 % Final    Lymphocytes % 03/12/2023 18.4 (A)  20.0 - 42.0 % Final    Monocytes % 03/12/2023 10.6  2.0 - 12.0 % Final    Eosinophils % 03/12/2023 1.1  0.0 - 6.0 % Final    Basophils % 03/12/2023 0.4  0.0 - 2.0 % Final    Neutrophils Absolute 03/12/2023 6.40  1.80 - 7.30 E9/L Final    Immature Granulocytes # 03/12/2023 0.06  E9/L Final    Lymphocytes Absolute 03/12/2023 1.71  1.50 - 4.00 E9/L Final    Monocytes Absolute 03/12/2023 0.99 (A)  0.10 - 0.95 E9/L Final    Eosinophils Absolute 03/12/2023 0.10  0.05 - 0.50 E9/L Final    Basophils Absolute 03/12/2023 0.04  0.00 - 0.20 E9/L Final    Vacuolated Neutrophils 03/12/2023 1+   Final    Anisocytosis 03/12/2023 1+   Final    Polychromasia 03/12/2023 1+   Final    Poikilocytes 03/12/2023 2+   Final    [place] Cells 03/12/2023 2+   Final    Target Cells 03/12/2023 1+   Final    Tear Drop Cells 03/12/2023 1+   Final    RPR 03/12/2023 NON-REACTIVE  Non-reactive Final    Color, UA 03/12/2023 Yellow  Straw/Yellow Final    Clarity, UA 03/12/2023 Clear  Clear Final    Glucose, Ur 03/12/2023 Negative  Negative mg/dL Final    Bilirubin Urine 03/12/2023 Negative  Negative Final    Ketones, Urine 03/12/2023 15 (A)  Negative mg/dL Final    Specific Gravity, UA 03/12/2023 1.020  1.005 - 1.030 Final    Blood, Urine 03/12/2023 Negative  Negative Final    pH, UA 03/12/2023 6.5  5.0 - 9.0 Final    Protein, UA 03/12/2023 TRACE  Negative mg/dL Final    Urobilinogen, Urine 03/12/2023 1.0  <2.0 E.U./dL Final    Nitrite, Urine 03/12/2023 Negative  Negative Final    Leukocyte Esterase, Urine 03/12/2023 TRACE (A)  Negative Final    Amphetamine Screen, Urine 03/12/2023 POSITIVE (A)  Negative <1000 ng/mL Final    Comment: High concentrations of ephedrine/pseudoephedrine or  phenylpropanolamine may cause false positive results  for amphetamine. Therefore, confirmatory testing for  amphetamine should be considered if clinically indicated.       Barbiturate Screen, Ur 03/12/2023 NOT DETECTED  Negative < 200 ng/mL Final    Benzodiazepine Screen, Urine 03/12/2023 NOT DETECTED  Negative < 200 ng/mL Final    Cannabinoid Scrn, Ur 03/12/2023 POSITIVE (A)  Negative < 50ng/mL Final    Cocaine Metabolite Screen, Urine 03/12/2023 NOT DETECTED  Negative < 300 ng/mL Final    Opiate Scrn, Ur 03/12/2023 NOT DETECTED  Negative < 300ng/mL Final    Note:  The Opiate Screen is not intended to detect Oxycodone. PCP Screen, Urine 03/12/2023 NOT DETECTED  Negative < 25 ng/mL Final    Methadone Screen, Urine 03/12/2023 NOT DETECTED  Negative <300 ng/mL Final    Oxycodone Urine 03/12/2023 NOT DETECTED  Negative <100 ng/mL Final    FENTANYL SCREEN, URINE 03/12/2023 NOT DETECTED  Negative <1 ng/mL Final    Drug Screen Comment: 03/12/2023 see below   Final    Comment: These drug screen results are for medical purposes only and  should not be considered definitive or confirmed. The drug  methodology concentration value must be greater than or equal  to the cutoff to be reported as positive. Confirmatory testing  orders and/or interpretive screening questions can be directed  to toxicology at [phone]. The absence of expected drug(s) and/or metabolite(s) may be due  to inappropriate timing of specimen collection relative to drug  administration, poor drug absorption, diluted/adulterated urine,  or limitations of screening testing methodology. Rapid HIV 1&2 03/12/2023 see below  Non-reactive Final    Non-reactive for HIV-1 and HIV-2 antibodies    WBC, UA 03/12/2023 1-3  0 - 5 /HPF Final    RBC, UA 03/12/2023 NONE  0 - 2 /HPF Final    Epithelial Cells, UA 03/12/2023 FEW  /HPF Final    Bacteria, UA 03/12/2023 NONE SEEN  None Seen /HPF Final    Sodium 03/13/2023 134  132 - 146 mmol/L Final    Potassium 03/13/2023 4.6  3.5 - 5.0 mmol/L Final    Chloride 03/13/2023 106  98 - 107 mmol/L Final    CO2 03/13/2023 20 (A)  22 - 29 mmol/L Final    Anion Gap 03/13/2023 8  7 - 16 mmol/L Final    Glucose 03/13/2023 99  74 - 99 mg/dL Final    BUN 03/13/2023 7  6 - 20 mg/dL Final    Creatinine 03/13/2023 0.8  0.5 - 1.0 mg/dL Final    Est, Glom Filt Rate 03/13/2023 >60  >=60 mL/min/1.73 Final    Comment: Pediatric calculator link  Daniel.at. org/professionals/kdoqi/gfr_calculatorped  Effective Oct 3, 2022  These results are not intended for use in patients  <25years of age. eGFR results are calculated without  a race factor using the 2021 CKD-EPI equation. Careful  clinical correlation is recommended, particularly when  comparing to results calculated using previous equations. The CKD-EPI equation is less accurate in patients with  extremes of muscle mass, extra-renal metabolism of  creatinine, excessive creatinine ingestion, or following  therapy that affects renal tubular secretion. Calcium 03/13/2023 8.5 (A)  8.6 - 10.2 mg/dL Final    WBC 03/13/2023 11.6 (A)  4.5 - 11.5 E9/L Final    RBC 03/13/2023 3.74  3.50 - 5.50 E12/L Final    Hemoglobin 03/13/2023 10.9 (A)  11.5 - 15.5 g/dL Final    Hematocrit 03/13/2023 33.4 (A)  34.0 - 48.0 % Final    MCV 03/13/2023 89.3  80.0 - 99.9 fL Final    MCH 03/13/2023 29.1  26.0 - 35.0 pg Final    MCHC 03/13/2023 32.6  32.0 - 34.5 % Final    RDW 03/13/2023 13.4  11.5 - 15.0 fL Final    Platelets 58/44/3246 159  130 - 450 E9/L Final    MPV 03/13/2023 13.5 (A)  7.0 - 12.0 fL Final       Assessment/Plan:    Christopher Parikh is a 34 y.o. female POD #1 s/p LTCS with QBL 569TW and no complications with appropriate postoperative recovery. 1. Neuro: ERAD protocol  2. CV: no issues  3. Pulm: encouraged IS  4. GI:  tolerating regular diet   -senna prn  -zofran/reglan prn  5. : appropriate UOP, Arreguin d/c'd  6. Heme: preophct 11.2-> 500QBL-> 10.9 postop hct, appropriate and asymptomatic  7. Postpartum: Per below  Rh +  MMR and Tdap if indicated    -local wound care  8. Prophy: scds, oob as tolerated, IS  9.  Dispo: Jaya Lopes MD, MPH

## 2023-03-13 NOTE — ANESTHESIA PROCEDURE NOTES
Spinal Block    Patient location during procedure: OR  End time: 3/12/2023 9:35 PM  Reason for block: primary anesthetic and at surgeon's request  Staffing  Performed: resident/CRNA   Anesthesiologist: Sean Varela MD  Resident/CRNA: HARPAL Pierson - CRNA  Spinal Block  Patient position: sitting  Prep: Betadine  Patient monitoring: cardiac monitor, continuous pulse ox, continuous capnometry and frequent blood pressure checks  Approach: midline  Location: L3/L4  Provider prep: mask, sterile gloves and sterile gown  Local infiltration: lidocaine  Needle  Needle type: Pencan   Needle gauge: 24 G  Needle length: 3.5 in  Assessment  Swirl obtained: Yes  CSF: clear  Attempts: 1  Hemodynamics: stable  Preanesthetic Checklist  Completed: patient identified, IV checked, site marked, risks and benefits discussed, surgical/procedural consents, equipment checked, pre-op evaluation, timeout performed, anesthesia consent given, oxygen available, monitors applied/VS acknowledged, fire risk safety assessment completed and verbalized and blood product R/B/A discussed and consented

## 2023-03-13 NOTE — FLOWSHEET NOTE
Patient admitted to room 320. Patient oriented to room and floor and instructed to call phone number provided or use call light PRN. Admission packet and infant safe sleep reviewed with verbalized understanding. Patient refusing end tidal Co2 at this time. Patient is now resting in bed and infant taken to nursery.

## 2023-03-13 NOTE — FLOWSHEET NOTE
Patient hostile with staff about blood work this morning and would like to refuse at this time. Patient stated, \"this is ridiculous maybe later, I am tired of being poked. \" Education provided to patient with verbalized understanding.

## 2023-03-13 NOTE — PROGRESS NOTES
Pt presents to antepartum unit with complaints of contractions every 3 minutes. Pt visibly uncomfortable  VSS  Denies VB, LOF, HA, or blurred vision  States +FM  Pt states she has had no prenatal care during this pregnancy.   Mary Ellen Blakely notified

## 2023-03-13 NOTE — H&P
Department of Obstetrics and Gynecology  Labor and Delivery  History & Physical    Patient:  Kirsty Bates Date:  3/12/2023  8:26 PM  Medical Record Number:  [id number]    CHIEF COMPLAINT:  contractions    PROBLEM LIST:     Patient Active Problem List   Diagnosis    Depression    Obesity (BMI 30.0-34. 9)    Headache    Vitamin D deficiency    Elevated blood pressure affecting pregnancy in third trimester, antepartum    40 weeks gestation of pregnancy    Uterine contractions during pregnancy    Normal pregnancy, antepartum    39 weeks gestation of pregnancy    History of  delivery    Postpartum care following  delivery           HISTORY OF PRESENT ILLNESS:    The patient is a 34 y.o.  female  at Unknown. Patient presents with a chief complaint as above and is being admitted for  contractions that began at 07:00. No LOF/VB. NO PNC. Reports LMP 23. Previous C/S x 2. Pt last ate at 17:00    ESTIMATED DUE DATE: Estimated Date of Delivery: None noted.     PRENATAL CARE:  Complicated by:  No PNC, Previous c/s x 2  GBS: not done    Past OB History  OB History          3    Para   2    Term   2            AB        Living   2         SAB        IAB        Ectopic        Molar        Multiple   0    Live Births   2                Past Medical History:        Diagnosis Date    Depression     Hypertension        Past Surgical History:        Procedure Laterality Date     SECTION N/A 10/3/2020     SECTION performed by Chandan Renteria MD at [facility] L[facility] OR     SECTION N/A 2022     SECTION performed by Augustin Carrera MD at [facility] L[facility]D OR       Allergies:  Bee venom    Social History:    Social History     Socioeconomic History    Marital status: Single     Spouse name: Not on file    Number of children: Not on file    Years of education: Not on file    Highest education level: Not on file   Occupational History    Not on file   Tobacco Use    Smoking status: Every Day     Packs/day: 0.20     Years: 1.00     Pack years: 0.20     Types: Cigarettes    Smokeless tobacco: Never   Vaping Use    Vaping Use: Never used   Substance and Sexual Activity    Alcohol use: No    Drug use: Yes     Types: Marijuana [place] Cashing)    Sexual activity: Not Currently   Other Topics Concern    Not on file   Social History Narrative    Not on file     Social Determinants of Health     Financial Resource Strain: Not on file   Food Insecurity: Not on file   Transportation Needs: Not on file   Physical Activity: Not on file   Stress: Not on file   Social Connections: Not on file   Intimate Partner Violence: Not on file   Housing Stability: Not on file       Family History:   History reviewed. No pertinent family history. Medications Prior to Admission:  Medications Prior to Admission: ferrous sulfate (IRON 325) 325 (65 Fe) MG tablet, Take 1 tablet by mouth 2 times daily (with meals) (Patient not taking: Reported on 3/12/2023)  docusate sodium (COLACE) 100 MG capsule, Take 1 capsule by mouth 2 times daily as needed for Constipation (Patient not taking: Reported on 3/12/2023)  ibuprofen (ADVIL;MOTRIN) 800 MG tablet, Take 1 tablet by mouth every 8 hours as needed for Pain (Patient not taking: Reported on 3/12/2023)  docusate (COLACE, DULCOLAX) 100 MG CAPS, Take 100 mg by mouth 2 times daily as needed (constipation) (Patient not taking: Reported on 3/12/2023)  lansinoh lanolin CREA ointment, Apply 1 Tube topically every hour as needed for Dry Skin (nipple discomfort) (Patient not taking: Reported on 3/12/2023)  Prenatal Multivit-Min-Fe-FA (PRE-TAMIA PO), Take by mouth (Patient not taking: Reported on 3/12/2023)  vitamin D (ERGOCALCIFEROL) 93108 UNITS CAPS capsule, Take 1 capsule by mouth once a week for 8 doses.     REVIEW OF SYSTEMS:  CONSTITUTIONAL:  negative  RESPIRATORY:  negative  CARDIOVASCULAR:  negative  GASTROINTESTINAL:  negative  ALLERGIC/IMMUNOLOGIC:  negative  NEUROLOGICAL: negative  BEHAVIOR/PSYCH:  negative    PHYSICAL EXAM:  Vitals:    23   Weight: 160 lb (72.6 kg)   Height: 5' 6\" (1.676 m)     General appearance:  awake, alert, cooperative, no apparent distress, and appears stated age  Neurologic:  Awake, alert, oriented to name, place and time. Skin: warm, dry, normal color, no rashes  Head:  NC,AT,NT  Eyes:  Sclerae white, pupils equal and reactive, red reflex normal bilaterally  Ears:  Well-positioned, well-formed pinnae; Hearing: intact  Nose:  Clear, normal mucosa  Throat:  Lips, tongue and mucosa are pink, moist and intact; no exudate  Neck:  Supple, symmetrical  Heart:  Regular rate & rhythm, S1 S2, no murmurs, rubs, or gallops  Lungs:  No increased work of breathing, good air exchange, CTA b/l. Abdomen:  Soft, non tender, gravid, consistent with her gestational age, Extremities: No clubbing, cyanosis, cords; No calf tenderness; no Edema:   Pulses:  Strong equal distal pulses, brisk capillary refill  Fetal heart rate:  Reassuring. Pelvis:  Adequate pelvis  Cervix: 4 cm / 75% / soft / -2  Contraction frequency:  3 minutes-7 minutes  Membranes:  Intact    Labs:  No results found for this or any previous visit (from the past 72 hour(s)). ASSESSMENT:  34 y.o.  female at Unknown    Active labor  Previous c/s x 2    No PNC  Patient Active Problem List   Diagnosis    Depression    Obesity (BMI 30.0-34. 9)    Headache    Vitamin D deficiency    Elevated blood pressure affecting pregnancy in third trimester, antepartum    40 weeks gestation of pregnancy    Uterine contractions during pregnancy    Normal pregnancy, antepartum    39 weeks gestation of pregnancy    History of  delivery    Postpartum care following  delivery     Fetus: Reassuring  GBS: not done    PLAN:  Discussed with Dr. Carol Hook for C/S    HARPAL Segovia CNM  3/12/2023 8:50 PM

## 2023-03-13 NOTE — PROGRESS NOTES
Assisted pt to the bathroom. Voided large amount. Instructed pt on tanika care for small amount of lochia rubra with no clots. Bedding changed. Pt sitting up in chair. Call light in reach.

## 2023-03-13 NOTE — PLAN OF CARE
Problem: Discharge Planning  Goal: Discharge to home or other facility with appropriate resources  Outcome: Progressing     Problem: Pain  Goal: Verbalizes/displays adequate comfort level or baseline comfort level  Outcome: Progressing     Problem: ABCDS Injury Assessment  Goal: Absence of physical injury  Outcome: Progressing

## 2023-03-13 NOTE — PROGRESS NOTES
Delivered per dr Bruner Persons via repeat c- section apgars8/9 condition stable.  Niccu present for delivery

## 2023-03-13 NOTE — BRIEF OP NOTE
Brief Postoperative Note      Patient: Reginald Bliss  YOB: 1995  MRN: [id number]    Date of Procedure: 3/12/2023    Pre-Op Diagnosis: Previous  delivery affecting pregnancy [O34.219]                                      No prenatal care  Active labor  Post-Op Diagnosis: Same       Procedure(s):   SECTION    Surgeon(s):  Marina Prahbakar DO    Assistant:  Armen Sutton DO    Anesthesia: Spinal    Estimated Blood Loss (mL): 821    Complications: None    Specimens:   No e     Implants:  * No implants in log *      Drains: * No LDAs found *    Findings: at 2159 a female was delivered apgars 8/  In the absence of a resident I assisted DR Barragan Delay in incision delivery and closer  Electronically signed by Anurag Joy DO on 3/12/2023 at 9:54 PM

## 2023-03-13 NOTE — CARE COORDINATION
SW Discharge Planning   SW received consult for \" No prenatal care\" ( Mother and baby also positive for amphetamines at delivery, mom positive for [facility] on 3/12/23)     SW met privately with Pancho Lindsay ( [phone]) mother to baby girl Dragan Winter ( 3/12/23) and introduced self and role. Michelle Kerr reported that she resides at the address listed in the chart with her children, Inder Reyes ( 10/3/20) and Cora Larios ( 2/25/22). Michelle Kerr reported father of the baby to be Mel Albright, and reported that he does NOT reside in the home. MICHAEL did address lack of prenatal care with Michelle Kerr, who reported that she felt too overwhelmed this pregnancy to receive care due to caring for her two children at home. We did talk at length about Nishas struggles with lack of emotional support and help with her children. MICHAEL did discuss [facility] and [facility] for additional support. Michelle Kerr was agreeable to [facility], as well as information on post partum depression. Michelle Kerr declined [facility]. Michelle Kerr stated pediatric care will be with Dr. Elida Lange. Michelle Kerr Reported that she has all needed items including a car seat and pack and play. We discussed safe sleep practices. Michelle Kerr  denied any past or current history of children services involvement, legal issues, domestic violence or mental health diagnosis. We discussed awareness of Post Partum Depression and encouraged contact with her OB if any problems arise. MICHAEL did address Asa's positive screens for THC and Amphetamines.  Michelle Kerr did admit to [facility] usage, and reported using a street drug she believed was homemade called \" [employer]\" that she received from a friend \" every trash night\" Michelle Kerr did express understanding for the need of a [facility] ( [phone]) referral. MICHAEL completed [facility] ( [phone]) referral to Radha Baltazar in intake     PLAN    [facility] and [facility] referrals were completed     Baby can NOT be discharged home until [facility] ( [phone]) provides disposition  SW [facility] staff [facility] ( [phone])      Electronically signed by CHILO Corado on 3/13/2023 at 2:17 PM

## 2023-03-13 NOTE — ANESTHESIA PRE PROCEDURE
Department of Anesthesiology  Preprocedure Note       Name:  Roni Waer   Age:  34 y.o.  :  1995                                          MRN:  [id number]         Date:  3/12/2023      Surgeon: Tiesha Choudhury):  Nirav Griffin DO    Procedure: Procedure(s):   SECTION    Medications prior to admission:   Prior to Admission medications    Medication Sig Start Date End Date Taking? Authorizing Provider   ferrous sulfate (IRON 325) 325 (65 Fe) MG tablet Take 1 tablet by mouth 2 times daily (with meals)  Patient not taking: Reported on 3/12/2023 2/28/22   Antonio Rhymes, APRN - CNM   docusate sodium (COLACE) 100 MG capsule Take 1 capsule by mouth 2 times daily as needed for Constipation  Patient not taking: Reported on 3/12/2023 2/28/22   Antonio Rhymes, APRN - CNM   ibuprofen (ADVIL;MOTRIN) 800 MG tablet Take 1 tablet by mouth every 8 hours as needed for Pain  Patient not taking: Reported on 3/12/2023 2/28/22   Antonio Rhymes, APRN - CNM   docusate (COLACE, DULCOLAX) 100 MG CAPS Take 100 mg by mouth 2 times daily as needed (constipation)  Patient not taking: Reported on 3/12/2023 2/28/22   Antonio Rhymes, APRN - CNM   lansinoh lanolin CREA ointment Apply 1 Tube topically every hour as needed for Dry Skin (nipple discomfort)  Patient not taking: Reported on 3/12/2023 10/6/20   Case Field MD   Prenatal Multivit-Min-Fe-FA (PRE- PO) Take by mouth  Patient not taking: Reported on 3/12/2023    Historical Provider, MD   vitamin D (ERGOCALCIFEROL) 97485 UNITS CAPS capsule Take 1 capsule by mouth once a week for 8 doses. 1/29/15 3/20/15  Stephen Rubio MD       Current medications:    No current facility-administered medications for this visit. No current outpatient medications on file.      Facility-Administered Medications Ordered in Other Visits   Medication Dose Route Frequency Provider Last Rate Last Admin    lactated ringers IV soln infusion   IntraVENous Continuous Juan Alberto Slider Rufino Coloncon, APRN - CNM        lactated ringers bolus  1,000 mL IntraVENous Once Uma Cheyanne, APRN - CNM        sodium chloride flush 0.9 % injection 10 mL  10 mL IntraVENous 2 times per day Uma Cheyanne, APRN - CNM        sodium chloride flush 0.9 % injection 10 mL  10 mL IntraVENous PRN Uma Cheyanne, APRN - CNM        0.9 % sodium chloride infusion   IntraVENous PRN Uma Cheyanne, APRN - CNM        sterile water injection             oxytocin (PITOCIN) 15 units in 250 mL infusion Override Pull             erythromycin (ROMYCIN) 5 MG/GM ophthalmic ointment             phytonadione (VITAMIN K) 1 MG/0.5ML injection             lactated ringers IV soln infusion   IntraVENous Continuous PRN Nevin Moore, APRN - CRNA   New Bag at 23    phenylephrine (BOB-SYNEPHRINE) 1 MG/10ML prefilled syringe (Push Dose)   IntraVENous PRN Joseph Destinee, APRN - CRNA   100 mcg at 23    bupivacaine 0.75% in dextrose 8.25% (intrathecal) (SENSORCAINE) 0.75-8.25 % injection   Spinal/Regional PRN [facility] [place] Destinee, APRN - CRNA   1.6 mg at 23    morphine (PF) injection   IntraSPINal PRN [facility] [place] Dest[place], APRN - CRNA   0.15 mg at 23    oxytocin (PITOCIN) 30 units in 500 mL infusion   IntraVENous Continuous PRN [facility] [place] Destinee, APRN - CRNA 909 mL/hr at 23 909 mL/hr at 23       Allergies: Allergies   Allergen Reactions    Bee Venom Anaphylaxis       Problem List:    Patient Active Problem List   Diagnosis Code    Depression F32. A    Obesity (BMI 30.0-34. 9) E66.9    Headache R51.9    Vitamin D deficiency E55.9    Elevated blood pressure affecting pregnancy in third trimester, antepartum O16.3    40 weeks gestation of pregnancy Z3A.40    Uterine contractions during pregnancy O47.9    Normal pregnancy, antepartum Z34.90    39 weeks gestation of pregnancy Z3A.39    History of  delivery Y61.378    Postpartum care following  delivery Z39.2   • No prenatal care in current pregnancy in third trimester O09.33   • Previous  section Z98.891   • Previous  delivery affecting pregnancy O34.219       Past Medical History:        Diagnosis Date   • Depression    • Hypertension        Past Surgical History:        Procedure Laterality Date   •  SECTION N/A 10/3/2020     SECTION performed by Ethan Navas MD at [facility] L&D OR   •  SECTION N/A 2022     SECTION performed by Arelis Chowdhury MD at [facility] L&D OR       Social History:    Social History     Tobacco Use   • Smoking status: Every Day     Packs/day: 0.20     Years: 1.00     Pack years: 0.20     Types: Cigarettes   • Smokeless tobacco: Never   Substance Use Topics   • Alcohol use: No                                Ready to quit: Not Answered  Counseling given: Not Answered      Vital Signs (Current):   There were no vitals filed for this visit.                                           BP Readings from Last 3 Encounters:   22 123/86   22 (!) 111/55   10/06/20 133/87       NPO Status:                                                                                 BMI:   Wt Readings from Last 3 Encounters:   23 160 lb (72.6 kg)   22 178 lb (80.7 kg)   10/02/20 202 lb 6.4 oz (91.8 kg)     There is no height or weight on file to calculate BMI.    CBC:   Lab Results   Component Value Date/Time    WBC 9.3 2023 09:00 PM    RBC 4.09 2023 09:00 PM    HGB 11.8 2023 09:00 PM    HCT 36.0 2023 09:00 PM    MCV 88.0 2023 09:00 PM    RDW 13.6 2023 09:00 PM     2023 09:00 PM       CMP:   Lab Results   Component Value Date/Time     2020 01:30 PM    K 3.9 2020 01:30 PM     2020 01:30 PM    CO2 20 2020 01:30 PM    BUN 4 2020 01:30 PM    CREATININE 0.6 2020 01:30 PM    GFRAA >60 2020 01:30 PM    LABGLOM  >60 09/25/2020 01:30 PM    GLUCOSE 98 09/25/2020 01:30 PM    PROT 6.1 09/25/2020 01:30 PM    CALCIUM 8.9 09/25/2020 01:30 PM    BILITOT 0.5 09/25/2020 01:30 PM    ALKPHOS 204 09/25/2020 01:30 PM    AST 21 09/25/2020 01:30 PM    ALT 18 09/25/2020 01:30 PM       POC Tests: No results for input(s): POCGLU, POCNA, POCK, POCCL, POCBUN, POCHEMO, POCHCT in the last 72 hours. Coags:   Lab Results   Component Value Date/Time    PROTIME 10.7 09/25/2020 01:30 PM    INR 0.9 09/25/2020 01:30 PM    APTT 26.9 09/25/2020 01:30 PM       HCG (If Applicable): No results found for: PREGTESTUR, PREGSERUM, HCG, HCGQUANT     ABGs: No results found for: PHART, PO2ART, VFT2RNB, PFX7XOX, BEART, C0TMQLTF     Type & Screen (If Applicable):  No results found for: LABABO, LABRH    Drug/Infectious Status (If Applicable):  No results found for: HIV, HEPCAB    COVID-19 Screening (If Applicable): No results found for: COVID19        Anesthesia Evaluation  Patient summary reviewed and Nursing notes reviewed no history of anesthetic complications:   Airway: Mallampati: II  TM distance: >3 FB   Neck ROM: full  Mouth opening: > = 3 FB   Dental: normal exam         Pulmonary:normal exam  breath sounds clear to auscultation  (+) current smoker          Patient smoked on day of surgery. Cardiovascular:Negative CV ROS  Exercise tolerance: good (>4 METS),   (+) hypertension: mild,         Rhythm: regular  Rate: normal           Beta Blocker:  Not on Beta Blocker         Neuro/Psych:   (+) headaches: migraine headaches, psychiatric history:depression/anxiety             GI/Hepatic/Renal: Neg GI/Hepatic/Renal ROS            Endo/Other: Negative Endo/Other ROS                    Abdominal:             Vascular: Other Findings:             Anesthesia Plan      general and spinal     ASA 2     (Discussed labor options with patient. Questions answered.  Patient agrees to spinal with GA as back up.)      MIPS: Postoperative opioids intended and Prophylactic antiemetics administered. Anesthetic plan and risks discussed with patient. Use of blood products discussed with patient whom consented to blood products. Plan discussed with attending. CBC   Lab Results   Component Value Date/Time    WBC 9.3 03/12/2023 09:00 PM    RBC 4.09 03/12/2023 09:00 PM    HGB 11.8 03/12/2023 09:00 PM    HCT 36.0 03/12/2023 09:00 PM    MCV 88.0 03/12/2023 09:00 PM    RDW 13.6 03/12/2023 09:00 PM     03/12/2023 09:00 PM     CMP    Lab Results   Component Value Date/Time     09/25/2020 01:30 PM    K 3.9 09/25/2020 01:30 PM     09/25/2020 01:30 PM    CO2 20 09/25/2020 01:30 PM    BUN 4 09/25/2020 01:30 PM    CREATININE 0.6 09/25/2020 01:30 PM    GFRAA >60 09/25/2020 01:30 PM    LABGLOM >60 09/25/2020 01:30 PM    GLUCOSE 98 09/25/2020 01:30 PM    PROT 6.1 09/25/2020 01:30 PM    CALCIUM 8.9 09/25/2020 01:30 PM    BILITOT 0.5 09/25/2020 01:30 PM    ALKPHOS 204 09/25/2020 01:30 PM    AST 21 09/25/2020 01:30 PM    ALT 18 09/25/2020 01:30 PM     BMP    Lab Results   Component Value Date/Time     09/25/2020 01:30 PM    K 3.9 09/25/2020 01:30 PM     09/25/2020 01:30 PM    CO2 20 09/25/2020 01:30 PM    BUN 4 09/25/2020 01:30 PM    CREATININE 0.6 09/25/2020 01:30 PM    CALCIUM 8.9 09/25/2020 01:30 PM    GFRAA >60 09/25/2020 01:30 PM    LABGLOM >60 09/25/2020 01:30 PM    GLUCOSE 98 09/25/2020 01:30 PM     POCGlucose  No results for input(s): GLUCOSE in the last 72 hours.      Coags  Lab Results   Component Value Date/Time    PROTIME 10.7 09/25/2020 01:30 PM    INR 0.9 09/25/2020 01:30 PM    APTT 26.9 09/25/2020 01:30 PM       HCG (If Applicable) No results found for: PREGTESTUR, PREGSERUM, HCG, HCGQUANT     ABGs   No results found for: PHART, PO2ART, CVK8VJO, VWV5LWR, BEART, S9AXDRPZ     Type & Screen (If Applicable)  Lab Results   Component Value Date    ABORH O POS 02/25/2022     Active Problem List with ICD10 Codes  Patient Active Problem List   Diagnosis Code    Depression F32. A    Obesity (BMI 30.0-34. 9) E66.9    Headache R51.9    Vitamin D deficiency E55.9    Elevated blood pressure affecting pregnancy in third trimester, antepartum O16.3    40 weeks gestation of pregnancy Z3A.40    Uterine contractions during pregnancy O47.9    Normal pregnancy, antepartum Z34.90    39 weeks gestation of pregnancy Z3A.39    History of  delivery Z98.891    Postpartum care following  delivery Z39.2    No prenatal care in current pregnancy in third trimester O09.33    Previous  section Z98.891    Previous  delivery affecting pregnancy O34.219       Herminia Manuel MD  2023  9:55 PM      Herminia Manuel MD   3/12/2023

## 2023-03-13 NOTE — OP NOTE
PATIENT:    Asa Nava    PREOPERATIVE DIAGNOSES:  1.         2. Previous C/S x 2 in labor           3. No prenatal care     POSTOPERATIVE DIAGNOSES:  Same      PROCEDURE:      Repeat low transverse  section. SURGEON:     Jose D Uribe DO    ASSISTANT:    Dr. Annemarie Alvarado MD      ESTIMATED BLOOD LOSS:   500 mL. COMPLICATIONS:     None. ANESTHESIA:    Spinal.         FINDINGS:   Live female         Infant Apgars 8 and 9 at 1 and 5 min respectively. Weight: pending. Normal tubes and ovaries bilaterally. DETAILS OF PROCEDURE:   With the patient in the supine position, spinal anesthesia was given without any complications. She was then placed in the supine position slightly tilted to the left. Abdomen was scrubbed and draped in the usual manner. Anesthesia level was checked and was found to be adequate. The abdomen was entered thru a transverse incision through a preexisting incision. The Bovie was used to go through the subcutaneous tissue. The fascia was entered in the same plane as the skin incision and  from the underlying rectus abdominis muscles which were  in the midline exposing the parietal peritoneum. The peritoneum was opened sharply in a longitudinal fashion. An Silviano retractor was placed in position and the visceral peritoneum overlying the lower uterine segment was opened transversely and a bladder flap was developed in a sharp manner. The lower uterine segment was opened in a low transverse fashion. The amniotic cavity was entered and Clear amniotic fluid was suctioned out. The baby was then delivered from the Cephalic position without any complications. The baby was handed over to the nursery nurse. Cord blood was saved. The placenta was then removed manually and the endometrial cavity was swept clean by a wet lap.  The edges of the uterine incision were grasped by Allis clamps and the incision itself was closed using a continuous locked suture of 0 monocryl. A second layer was used to imbricate the first. Gennaro powder was applied. Tubes and ovaries were inspected and appeared to be normal. The gutters were cleared of any blood clots and debris. The operative field appeared to be hemostatic. The Silviano retractor was removed. Correct needle, sponge and instrument count was reported and the abdomen was then closed in layers using #1 Stratafix for the fascia and a 4-0 vicryl on a yash needle for the skin. Steristrips were applied followed by a sterile dressing and the patient was then transferred to the recovery room in good stable condition. Dr. Armen Sutton assisted in the place of a resident and helped with visualization and retraction.      Monalisa Anderson DO

## 2023-03-13 NOTE — ANESTHESIA PRE PROCEDURE
Department of Anesthesiology  Preprocedure Note       Name:  Serafin Grier   Age:  34 y.o.  :  1995                                          MRN:  [id number]         Date:  3/12/2023      Surgeon: Jeramy Gould):  Aicha Benedict DO    Procedure: Procedure(s):   SECTION    Medications prior to admission:   Prior to Admission medications    Medication Sig Start Date End Date Taking? Authorizing Provider   ferrous sulfate (IRON 325) 325 (65 Fe) MG tablet Take 1 tablet by mouth 2 times daily (with meals)  Patient not taking: Reported on 3/12/2023 2/28/22   HARPAL Baez CNM   docusate sodium (COLACE) 100 MG capsule Take 1 capsule by mouth 2 times daily as needed for Constipation  Patient not taking: Reported on 3/12/2023 2/28/22   HARPAL Baez CNM   ibuprofen (ADVIL;MOTRIN) 800 MG tablet Take 1 tablet by mouth every 8 hours as needed for Pain  Patient not taking: Reported on 3/12/2023 2/28/22   HARPAL Baez CNM   docusate (COLACE, DULCOLAX) 100 MG CAPS Take 100 mg by mouth 2 times daily as needed (constipation)  Patient not taking: Reported on 3/12/2023 2/28/22   HARPAL Baez CNM   lansinoh lanolin CREA ointment Apply 1 Tube topically every hour as needed for Dry Skin (nipple discomfort)  Patient not taking: Reported on 3/12/2023 10/6/20   Peter Barrientos MD   Prenatal Multivit-Min-Fe-FA (PRE-TAMIA PO) Take by mouth  Patient not taking: Reported on 3/12/2023    Historical Provider, MD   vitamin D (ERGOCALCIFEROL) 12407 UNITS CAPS capsule Take 1 capsule by mouth once a week for 8 doses. 1/29/15 3/20/15  Eliane Parkinson MD       Current medications:    No current facility-administered medications for this encounter. Allergies: Allergies   Allergen Reactions    Bee Venom Anaphylaxis       Problem List:    Patient Active Problem List   Diagnosis Code    Depression F32. A    Obesity (BMI 30.0-34. 9) E66.9    Headache R51.9    Vitamin D deficiency E55.9  Elevated blood pressure affecting pregnancy in third trimester, antepartum O16.3    40 weeks gestation of pregnancy Z3A.40    Uterine contractions during pregnancy O47.9    Normal pregnancy, antepartum Z34.90    39 weeks gestation of pregnancy Z3A.39    History of  delivery Z98.891    Postpartum care following  delivery Z39.2    No prenatal care in current pregnancy in third trimester O09.33    Previous  section Z98.891       Past Medical History:        Diagnosis Date    Depression     Hypertension        Past Surgical History:        Procedure Laterality Date     SECTION N/A 10/3/2020     SECTION performed by Arjun Washburn MD at [facility] L&D OR     SECTION N/A 2022     SECTION performed by Sascha Jasso MD at [facility] L&D OR       Social History:    Social History     Tobacco Use    Smoking status: Every Day     Packs/day: 0.20     Years: 1.00     Pack years: 0.20     Types: Cigarettes    Smokeless tobacco: Never   Substance Use Topics    Alcohol use: No                                Ready to quit: Not Answered  Counseling given: Not Answered      Vital Signs (Current):   Vitals:    23   Weight: 160 lb (72.6 kg)   Height: 5' 6\" (1.676 m)                                              BP Readings from Last 3 Encounters:   22 123/86   22 (!) 111/55   10/06/20 133/87       NPO Status:                                                                                 BMI:   Wt Readings from Last 3 Encounters:   23 160 lb (72.6 kg)   22 178 lb (80.7 kg)   10/02/20 202 lb 6.4 oz (91.8 kg)     Body mass index is 25.82 kg/m².     CBC:   Lab Results   Component Value Date/Time    WBC 9.0 2022 05:50 AM    RBC 3.48 2022 05:50 AM    HGB 10.1 2022 05:50 AM    HCT 32.6 2022 05:50 AM    MCV 93.7 2022 05:50 AM    RDW 14.0 2022 05:50 AM     2022 05:50 AM       CMP:   Lab Results   Component Value Date/Time     09/25/2020 01:30 PM    K 3.9 09/25/2020 01:30 PM     09/25/2020 01:30 PM    CO2 20 09/25/2020 01:30 PM    BUN 4 09/25/2020 01:30 PM    CREATININE 0.6 09/25/2020 01:30 PM    GFRAA >60 09/25/2020 01:30 PM    LABGLOM >60 09/25/2020 01:30 PM    GLUCOSE 98 09/25/2020 01:30 PM    PROT 6.1 09/25/2020 01:30 PM    CALCIUM 8.9 09/25/2020 01:30 PM    BILITOT 0.5 09/25/2020 01:30 PM    ALKPHOS 204 09/25/2020 01:30 PM    AST 21 09/25/2020 01:30 PM    ALT 18 09/25/2020 01:30 PM       POC Tests: No results for input(s): POCGLU, POCNA, POCK, POCCL, POCBUN, POCHEMO, POCHCT in the last 72 hours. Coags:   Lab Results   Component Value Date/Time    PROTIME 10.7 09/25/2020 01:30 PM    INR 0.9 09/25/2020 01:30 PM    APTT 26.9 09/25/2020 01:30 PM       HCG (If Applicable): No results found for: PREGTESTUR, PREGSERUM, HCG, HCGQUANT     ABGs: No results found for: PHART, PO2ART, QII2MDK, QNZ9ACG, BEART, X9GCINKY     Type & Screen (If Applicable):  No results found for: LABABO, LABRH    Drug/Infectious Status (If Applicable):  No results found for: HIV, HEPCAB    COVID-19 Screening (If Applicable): No results found for: COVID19        Anesthesia Evaluation  Patient summary reviewed and Nursing notes reviewed no history of anesthetic complications:   Airway: Mallampati: II  TM distance: >3 FB   Neck ROM: full  Mouth opening: > = 3 FB   Dental: normal exam         Pulmonary:normal exam  breath sounds clear to auscultation  (+) current smoker          Patient smoked on day of surgery.                  Cardiovascular:Negative CV ROS  Exercise tolerance: good (>4 METS),   (+) hypertension: mild,         Rhythm: regular  Rate: normal           Beta Blocker:  Not on Beta Blocker         Neuro/Psych:   (+) headaches: migraine headaches, psychiatric history:depression/anxiety             GI/Hepatic/Renal: Neg GI/Hepatic/Renal ROS            Endo/Other: Negative Endo/Other ROS Abdominal:             Vascular: Other Findings:           Anesthesia Plan      general and spinal     ASA 2     (Discussed labor options with patient. Questions answered. Patient agrees to spinal with GA as back up.)      MIPS: Postoperative opioids intended and Prophylactic antiemetics administered. Anesthetic plan and risks discussed with patient. Use of blood products discussed with patient whom consented to blood products. Plan discussed with attending. CBC   Lab Results   Component Value Date/Time    WBC 9.0 02/26/2022 05:50 AM    RBC 3.48 02/26/2022 05:50 AM    HGB 10.1 02/26/2022 05:50 AM    HCT 32.6 02/26/2022 05:50 AM    MCV 93.7 02/26/2022 05:50 AM    RDW 14.0 02/26/2022 05:50 AM     02/26/2022 05:50 AM     CMP    Lab Results   Component Value Date/Time     09/25/2020 01:30 PM    K 3.9 09/25/2020 01:30 PM     09/25/2020 01:30 PM    CO2 20 09/25/2020 01:30 PM    BUN 4 09/25/2020 01:30 PM    CREATININE 0.6 09/25/2020 01:30 PM    GFRAA >60 09/25/2020 01:30 PM    LABGLOM >60 09/25/2020 01:30 PM    GLUCOSE 98 09/25/2020 01:30 PM    PROT 6.1 09/25/2020 01:30 PM    CALCIUM 8.9 09/25/2020 01:30 PM    BILITOT 0.5 09/25/2020 01:30 PM    ALKPHOS 204 09/25/2020 01:30 PM    AST 21 09/25/2020 01:30 PM    ALT 18 09/25/2020 01:30 PM     BMP    Lab Results   Component Value Date/Time     09/25/2020 01:30 PM    K 3.9 09/25/2020 01:30 PM     09/25/2020 01:30 PM    CO2 20 09/25/2020 01:30 PM    BUN 4 09/25/2020 01:30 PM    CREATININE 0.6 09/25/2020 01:30 PM    CALCIUM 8.9 09/25/2020 01:30 PM    GFRAA >60 09/25/2020 01:30 PM    LABGLOM >60 09/25/2020 01:30 PM    GLUCOSE 98 09/25/2020 01:30 PM     POCGlucose  No results for input(s): GLUCOSE in the last 72 hours.      [facility]  Lab Results   Component Value Date/Time    PROTIME 10.7 09/25/2020 01:30 PM    INR 0.9 09/25/2020 01:30 PM    APTT 26.9 09/25/2020 01:30 PM       HCG (If Applicable) No results found for: PREGTESTUR, PREGSERUM, HCG, HCGQUANT     ABGs   No results found for: PHART, PO2ART, GEI3QSC, UFK8OBP, BEART, K5VMHJSM     Type & Screen (If Applicable)  Lab Results   Component Value Date    ABORH O POS 2022     Active Problem List with ICD10 Codes  Patient Active Problem List   Diagnosis Code    Depression F32. A    Obesity (BMI 30.0-34. 9) E66.9    Headache R51.9    Vitamin D deficiency E55.9    Elevated blood pressure affecting pregnancy in third trimester, antepartum O16.3    40 weeks gestation of pregnancy Z3A.40    Uterine contractions during pregnancy O47.9    Normal pregnancy, antepartum Z34.90    39 weeks gestation of pregnancy Z3A.39    History of  delivery Z98.891    Postpartum care following  delivery Z39.2    No prenatal care in current pregnancy in third trimester O09.33    Previous  section Z98.891       HARPAL Richard CRNA  2023  9:03 PM      HARPAL Richard CRNA   3/12/2023

## 2023-03-13 NOTE — ANESTHESIA POSTPROCEDURE EVALUATION
Department of Anesthesiology  Postprocedure Note    Patient: Roni Ware  MRN: [id number]  YOB: 1995  Date of evaluation: 3/13/2023      Procedure Summary     Date: 23 Room / Location: [facility] OR  [facility]    Anesthesia Start:  Anesthesia Stop:     Procedure:  SECTION (Uterus) Diagnosis:       Previous  delivery affecting pregnancy      (Previous  delivery affecting pregnancy [O34.219])    Surgeons: Nirav Griffin DO Responsible Provider: Sean Varela MD    Anesthesia Type: Spinal ASA Status: 2          Anesthesia Type: Spinal    Amrit Phase I: Amrit Score: 10    Amrit Phase II:        Anesthesia Post Evaluation    Patient location during evaluation: bedside  Patient participation: complete - patient participated  Level of consciousness: awake  Pain score: 0  Airway patency: patent  Nausea & Vomiting: no vomiting and no nausea  Cardiovascular status: hemodynamically stable  Respiratory status: acceptable  Hydration status: euvolemic  Comments: Patient satisfied.

## 2023-03-13 NOTE — PROGRESS NOTES
Pt moved to room 320 via cart with baby in her arms. Condition stable.  Gave benedryl tylenol and oxycodone po before moving report to staff

## 2023-03-14 LAB
INTEGRITY CHECK, CREATININE, URINE: 331.6
INTEGRITY CHECK, OXIDANT, URINE: <40
INTEGRITY CHECK, PH, URINE: 6.7 (ref 4.5–9)
INTEGRITY CHECK, SPECIFIC GRAVITY, URINE: 1.02 (ref 1–1.03)
INTEGRITY CHECK, SPECIMEN INTEGRITY, URINE: ABNORMAL

## 2023-03-14 PROCEDURE — 99024 POSTOP FOLLOW-UP VISIT: CPT | Performed by: PHYSICIAN ASSISTANT

## 2023-03-14 PROCEDURE — 6370000000 HC RX 637 (ALT 250 FOR IP): Performed by: STUDENT IN AN ORGANIZED HEALTH CARE EDUCATION/TRAINING PROGRAM

## 2023-03-14 PROCEDURE — 1220000000 HC SEMI PRIVATE OB R&B

## 2023-03-14 RX ADMIN — DOCUSATE SODIUM 100 MG: 100 CAPSULE, LIQUID FILLED ORAL at 08:29

## 2023-03-14 RX ADMIN — OXYCODONE HYDROCHLORIDE 10 MG: 5 TABLET ORAL at 05:56

## 2023-03-14 RX ADMIN — IBUPROFEN 600 MG: 600 TABLET, FILM COATED ORAL at 20:07

## 2023-03-14 RX ADMIN — IBUPROFEN 600 MG: 600 TABLET, FILM COATED ORAL at 08:29

## 2023-03-14 RX ADMIN — OXYCODONE HYDROCHLORIDE 10 MG: 5 TABLET ORAL at 13:36

## 2023-03-14 RX ADMIN — DOCUSATE SODIUM 100 MG: 100 CAPSULE, LIQUID FILLED ORAL at 20:07

## 2023-03-14 RX ADMIN — OXYCODONE HYDROCHLORIDE 10 MG: 5 TABLET ORAL at 17:46

## 2023-03-14 ASSESSMENT — PAIN DESCRIPTION - ORIENTATION
ORIENTATION: LOWER
ORIENTATION: LOWER

## 2023-03-14 ASSESSMENT — PAIN - FUNCTIONAL ASSESSMENT
PAIN_FUNCTIONAL_ASSESSMENT: ACTIVITIES ARE NOT PREVENTED

## 2023-03-14 ASSESSMENT — PAIN DESCRIPTION - DESCRIPTORS
DESCRIPTORS: CRAMPING;DISCOMFORT
DESCRIPTORS: CRAMPING;DISCOMFORT
DESCRIPTORS: ACHING;DISCOMFORT;SORE
DESCRIPTORS: ACHING;DISCOMFORT;SORE

## 2023-03-14 ASSESSMENT — PAIN SCALES - GENERAL
PAINLEVEL_OUTOF10: 4
PAINLEVEL_OUTOF10: 8
PAINLEVEL_OUTOF10: 2
PAINLEVEL_OUTOF10: 2
PAINLEVEL_OUTOF10: 7
PAINLEVEL_OUTOF10: 3

## 2023-03-14 ASSESSMENT — PAIN DESCRIPTION - LOCATION
LOCATION: ABDOMEN;INCISION
LOCATION: INCISION;ABDOMEN
LOCATION: ABDOMEN;INCISION
LOCATION: ABDOMEN;INCISION

## 2023-03-14 ASSESSMENT — PAIN DESCRIPTION - PAIN TYPE: TYPE: SURGICAL PAIN

## 2023-03-14 NOTE — PROGRESS NOTES
SUBJECTIVE:  Patient without complaint. Pain well controlled. Voiding without difficulty. Passing flatus. Tolerating regular diet. Mild lochia, denies passing clots. Ambulating in hallway. Formula feeding infant. Pressure dressing removed, Mepilex dressing intact. OBJECTIVE:  /77   Pulse (!) 101   Temp 98.2 °F (36.8 °C) (Oral)   Resp 18   Ht 5' 6\" (1.676 m)   Wt 160 lb (72.6 kg)   LMP 2022 (Approximate)   SpO2 96%   Breastfeeding Unknown   BMI 25.82 kg/m²   Recent Labs     23  0640 23  2100   WBC 11.6* 9.3   HGB 10.9* 11.8   HCT 33.4* 36.0   MCV 89.3 88.0    187      Physical Exam:  General: A&O. Cooperative. Comfortable. Coronary: RRR  Pulmonary: CTA b/l  Abdomen: soft, appropriately tender. (+) BS x 4 quadrants. Incision covered with Mepilex dressing: small dime size old blood stain noted right side of dressing, pressure dressing . i.e., towel and elastase removed   Uterus firm, nontender  Peripad: Lochia thin rubra  Back: (-) CVA or paraspinal tenderness. Neuro: intact  Extremities: trace BLE edema, (-) calf tenderness. ASSESSMENT/PLAN: 33 yo female  39w6d s/p repeat LTCS at 21:49 2023 secondary to 2 previous  sections. Presented in active labor. No prenatal care.    Postoperative Day #2  (+) UDS cannabinoid  Advance care  Anticipate discharge tomorrow am       Sandoval Montelongo 3/14/2023 7:56 AM

## 2023-03-14 NOTE — CARE COORDINATION
SW Discharge Planning     SW received a call from [facility] ( [phone])  Patti Mccain who reported that she is involved with the family, and that baby CAN be discharged to mom and that Patti WILL follow in the community .    PLAN    Baby CAN be discharged home when medically ready, children services WILL follow in the community     Electronically signed by CHILO Davis on 3/14/2023 at 9:44 AM

## 2023-03-15 VITALS
SYSTOLIC BLOOD PRESSURE: 140 MMHG | TEMPERATURE: 98.6 F | RESPIRATION RATE: 18 BRPM | BODY MASS INDEX: 25.71 KG/M2 | HEART RATE: 102 BPM | DIASTOLIC BLOOD PRESSURE: 83 MMHG | HEIGHT: 66 IN | OXYGEN SATURATION: 96 % | WEIGHT: 160 LBS

## 2023-03-15 LAB
AMPHETAMINE QUANTITATIVE URINE: >1000
CHLAMYDIA DNA UR QL NAA+PROBE: NEGATIVE
COMMENT: NORMAL
EPHEDRINE, QUANTITATIVE, URINE: <100
GP B STREP SPEC QL CULT: ABNORMAL
MDA, QUANTITATIVE, URINE: <100
MDEA, QUANTITATIVE, URINE: <100
MDMA, QUANTITATIVE, URINE: <100
METHAMPHETAMINE QUANTITATIVE URINE: >1000
N GONORRHOEA DNA UR QL NAA+PROBE: NEGATIVE
ORGANISM: ABNORMAL
PHENTERMINE, URINE, QUANTITATIVE: <100
RUBELLA ANTIBODY IGG: NORMAL
SPECIMEN SOURCE: NORMAL
THC NORMALIZED, QUANTITIATIVE, URINE: 9.2
THC-COOH, QUANTITATIVE, URINE: 30.6

## 2023-03-15 PROCEDURE — 6370000000 HC RX 637 (ALT 250 FOR IP): Performed by: STUDENT IN AN ORGANIZED HEALTH CARE EDUCATION/TRAINING PROGRAM

## 2023-03-15 PROCEDURE — 99024 POSTOP FOLLOW-UP VISIT: CPT | Performed by: NURSE PRACTITIONER

## 2023-03-15 RX ORDER — TRAMADOL HYDROCHLORIDE 50 MG/1
50 TABLET ORAL EVERY 6 HOURS PRN
Qty: 12 TABLET | Refills: 0 | Status: SHIPPED | OUTPATIENT
Start: 2023-03-15 | End: 2023-03-18

## 2023-03-15 RX ORDER — OXYCODONE HYDROCHLORIDE 5 MG/1
5 TABLET ORAL EVERY 6 HOURS PRN
Qty: 10 TABLET | Refills: 0 | Status: SHIPPED | OUTPATIENT
Start: 2023-03-15 | End: 2023-03-15 | Stop reason: HOSPADM

## 2023-03-15 RX ORDER — IBUPROFEN 600 MG/1
600 TABLET ORAL EVERY 8 HOURS SCHEDULED
Qty: 120 TABLET | Refills: 3 | Status: SHIPPED | OUTPATIENT
Start: 2023-03-15

## 2023-03-15 RX ADMIN — OXYCODONE HYDROCHLORIDE 10 MG: 5 TABLET ORAL at 00:44

## 2023-03-15 RX ADMIN — DOCUSATE SODIUM 100 MG: 100 CAPSULE, LIQUID FILLED ORAL at 08:52

## 2023-03-15 RX ADMIN — IBUPROFEN 600 MG: 600 TABLET, FILM COATED ORAL at 06:21

## 2023-03-15 RX ADMIN — OXYCODONE HYDROCHLORIDE 10 MG: 5 TABLET ORAL at 08:52

## 2023-03-15 RX ADMIN — OXYCODONE HYDROCHLORIDE 10 MG: 5 TABLET ORAL at 14:24

## 2023-03-15 ASSESSMENT — PAIN SCALES - GENERAL
PAINLEVEL_OUTOF10: 6
PAINLEVEL_OUTOF10: 2
PAINLEVEL_OUTOF10: 7

## 2023-03-15 ASSESSMENT — PAIN - FUNCTIONAL ASSESSMENT
PAIN_FUNCTIONAL_ASSESSMENT: ACTIVITIES ARE NOT PREVENTED

## 2023-03-15 ASSESSMENT — PAIN DESCRIPTION - ORIENTATION
ORIENTATION: LOWER
ORIENTATION: LOWER

## 2023-03-15 ASSESSMENT — PAIN DESCRIPTION - ONSET
ONSET: ON-GOING
ONSET: ON-GOING

## 2023-03-15 ASSESSMENT — PAIN DESCRIPTION - FREQUENCY
FREQUENCY: INTERMITTENT
FREQUENCY: INTERMITTENT

## 2023-03-15 ASSESSMENT — PAIN DESCRIPTION - LOCATION
LOCATION: ABDOMEN
LOCATION: ABDOMEN;INCISION

## 2023-03-15 ASSESSMENT — PAIN DESCRIPTION - DESCRIPTORS
DESCRIPTORS: DISCOMFORT
DESCRIPTORS: ACHING;DISCOMFORT;SORE
DESCRIPTORS: CRAMPING;DISCOMFORT
DESCRIPTORS: ACHING;DISCOMFORT;SORE

## 2023-03-15 ASSESSMENT — PAIN DESCRIPTION - PAIN TYPE: TYPE: SURGICAL PAIN

## 2023-03-15 NOTE — PROGRESS NOTES
CLINICAL PHARMACY NOTE: MEDS TO BEDS    Total # of Prescriptions Filled: 2   The following medications were delivered to the patient:  Ibuprofen 600 mg   Tramadol 50 mg     Additional Documentation:  Delivered to patient -- Rory Loya

## 2023-03-15 NOTE — DISCHARGE INSTRUCTIONS
Follow up with your OB doctor in  1 week for a  delivery or in 6 weeks for a vaginal delivery unless otherwise instructed. Call office for appointment. .  For breastfeeding support, you can contact our lactation specialists at [phone] or   [phone]. DIET  Eat a well balanced diet focusing on foods high in fiber and protein  Drink plenty of fluids especially water. To avoid constipation you may take a mild stool softener as recommended by your doctor or midwife. ACTIVITY  Gradually increase your activity. Resume exercise regimen only after advised by your doctor or midwife. Avoid lifting anything heavier than your baby or a gallon of milk until OK'd by your physician or midlife. Avoid driving until your doctor or midwife has given their approval.  Pooja Parker slowly from a lying to sitting and then a standing position. Climb stairs one at a time. Use caution when carrying your baby up and down the stairs. No sexual activity for 6 weeks or until advised by your doctor - Nothing in vagina: intercourse, tampons, or douching. Be prepared to discuss family planning at your follow-up OB visit. You may feel tired or have a lack of energy. You may continue your prenatal vitamin to replenish nutrients post delivery. Nap when infant naps to catch up on sleep. May return to work or school in 6 weeks or as directed by physician or midlife. Take pain medication as prescribed whenever you need them  Take care of yourself by sleeping/resting as much as possible. Let someone else care for you, your infant and housework as much as possible for a while. EMOTIONS  You may feed cruz, sad, teary, & overwhelmed. If infant will not stop crying, contact another adult for help or place infant in their crib on their back and take a break. NEVER shake your infant.     Call your doctor if you have unrelieved feelings of: inability to cope, anxiety, lack of interest in the infant/family, eating and sleeping disturbances,     BLEEDING  Vaginal bleeding will decrease in amount over the next few weeks. It should be like the end of your period like a brownish discharge. No different odor or color than a regular period. You will notice that as your activity increases, your flow may increase. This is your body's way of telling you, you need to take things easier and rest more often. Abdominal cramping should not get any worse than it is now. Take your pain medications as needed for the next few days. BREAST CARE  For breastfeeding moms:  If you become engorged, feeding may be more difficult or painful for 1-2 days. You may find it helpful to hand express some milk so that the infant can latch on more easily. While breastfeeding, continue to take your prenatal vitamins as directed by your doctor or midwife. Only take medications verified as safe for breastfeeding. If you start any new medications other than the ones you have had in the hospital, contact your pediatrician to see if they are safe for breastfeeding. Wear a support bra 24/7. You can pump your milk after breast feeding and freeze milk for six months in the freezer. When you are ready to use it, thaw it out in the refrigerator and use in 24 hours. Label the containers made for breast milk with day and time of pumping. Establish breast feeding for 2 weeks before you pump breast milk to save. Use your lanolin ointment/cream on your nipples after baby nurses. You need not wipe it off when baby nurses again. There are nipple shields and disks for sore nipples. Babies should eat every two to three hours. Avoid gassy foods (cabbage, onions, saurkraut, baked beans, cauliflower, broccoli) and spicy Ronnie or [place] foods. They might give the baby gas or make your milk taste funny to baby.  But if you have a craving, eat the food and see if it affects the baby and if it does, delete it from your diet. If it does not affect the baby, go ahead and eat it. Avoid caffeine at bedtime. (chocolate has a lot of caffeine) if the baby is sensitive to it. For non-breastfeeding moms:  You may apply ice packs to your breasts over your bra for twenty minutes at a time for comfort. Avoid stimulation to your breasts, when showering allow the water to strike your back not your breasts. Wear a good fitting bra until your milk dries, such as a sports bra. If your breasts are very sore, buy a cabbage and wet some of the inner leaves and place in your bra over your breasts. Your breasts may feel warm when your milk comes in. This is normal.    INCISIONAL CARE / CHUCKIE CARE  Clean your incision in the shower with mild soap. After shower pat the incision area dry and leave open to air. If used, Steri-stipes should fall off in shower by 2 weeks. If used, Jazmín Yobani should be removed by the OB in office by 1 week. If used/ordered, an abdominal binder may provide support for your incision. Use the chuckie-bottle after toileting until bleeding stops. It promotes healing and prevents infection. You are prone to urinary tract infections after a delivery ( c section or vaginal delivery). Drink a lot of fluids. Take a shower instead of a tub bath until bleeding stops. Cleanse your perineum from front to back  If used, stitches or internal clips will dissolve in 4-6 weeks. You may use a sitz bath or soak in a clean tub as needed for comfort. Kegel exercises will help restore bladder control. You may use cool compress on incision site. SWELLING   It takes about 2 weeks to get rid of all of the extra fluid you have from having a baby. Your feet and hands may swell up more than they are now. Keep your legs elevated when sitting or lying. When wearing stocking or socks, make sure they are not too tight. WHEN TO CALL THE DOCTOR  If you have a temp of 100.4 or more.    Your abdomen is tender to touch.  You are passing blood clots bigger than the size of a lemon. If you are experiencing extreme weakness or dizziness. If you are having flu-like symptoms such as achy muscles or joints. If you have pain that cannot be relieved. You have persistent burning with urination or frequency. You have swelling, bleeding, drainage, foul odor, redness, or warmth in/around your incision or stitches. You have a red, warm, tender area in you calf. Call if you have concerns about your well-being or if you feel you may be showing signs of post partum depression, or have thoughts of harming yourself or infant. Increased pain at the site of the episiotomy. Difficulty urinating. Difficulty breathing with or without chest pain. Headache not relieved by pain meds. If you are saturating more than one maxi pad in an hour, foul smelling vaginal discharge, sudden continuing increased vaginal bleeding with or without clots. If you develop a warm, red, tender area on your breast, have nipple discharge which is foul smelling or contains pus, or develop a fever. NEVER SHAKE A BABY PROMISE. Shaking can kill a baby. It can also cause seizures, brain damage, learning problems,  Cerebral palsy, blindness and other serious health and developmental problems. I (have seen) (am aware of) the video about shaking a baby and understand that shaking a baby is a serious form of child abuse. I PROMISE NEVER TO SHAKE MY BABY    I understand that caregivers other than the mother often shakes babies. I also promise to discuss the dangers of shaking a baby with everyone who takes care of my baby. I promise to tell anyone who care for my baby to never, never shake my baby.

## 2023-03-15 NOTE — DISCHARGE SUMMARY
Obstetrical Discharge Form    Primary OB Clinician: Tatiana Jacinto  Postpartum repeat ltcs Day #3    Gestational Age at time of delivery: 45.10    Date of Delivery: 3/12/23     Delivery Type: repeat  section, low transverse incision    Baby: Liveborn female,     Intrapartum complications: no prenatal care    Episiotomy: none,    Feeding method: bottle - Similac with iron    Discharge Date: 3/15/23    Condition: stable  Plan:   Discharge home  Hx of substance abuse  Follow-up appointment with Dr. Tatiana Jacinto in 1 weeks.

## 2023-03-15 NOTE — PROGRESS NOTES
POD #3    Patient:  Sabrina Mejia Date:  3/12/2023  8:26 PM  Medical Record Number:  [id number]   Today's Date: 3/15/2023    S: No complaints; tolerating diet: affirms; ambulating well: affirms; voiding without difficulty:  affirms; bm: affirms; flatus: affirms; pain meds appropriate: affirms; vaginal bleeding: thin lochia.     O:   Vitals:    03/14/23 0740 03/14/23 2311 03/15/23 0044 03/15/23 0745   BP: 131/77 121/65  (!) 140/83   Pulse: (!) 101 98  (!) 102   Resp: 18 16 16 18   Temp: 98.2 °F (36.8 °C) 98.2 °F (36.8 °C)  98.6 °F (37 °C)   TempSrc: Oral Oral  Oral   SpO2: 96% 98%  96%   Weight:       Height:         Gen: A&O, cooperative, pleasant   Heart: RRR   Lungs: CTA b/l   Abd; soft, NT, non distended, +BS  Back: no CVA or paraspinal tenderness   Ext: No clubbing, cyanosis, edema:trace , no cords palpable, no calf tenderness   Neuro: intact   Inc: clean, dry, intact (mepilex dressing intact)  Uterus: firm, well contracted, nt   Elisabeth pad: thin lochia    Recent Results (from the past 72 hour(s))   TYPE AND SCREEN    Collection Time: 03/12/23  9:00 PM   Result Value Ref Range    ABO/Rh O POS     Antibody Screen NEG    CBC with Auto Differential    Collection Time: 03/12/23  9:00 PM   Result Value Ref Range    WBC 9.3 4.5 - 11.5 E9/L    RBC 4.09 3.50 - 5.50 E12/L    Hemoglobin 11.8 11.5 - 15.5 g/dL    Hematocrit 36.0 34.0 - 48.0 %    MCV 88.0 80.0 - 99.9 fL    MCH 28.9 26.0 - 35.0 pg    MCHC 32.8 32.0 - 34.5 %    RDW 13.6 11.5 - 15.0 fL    Platelets 345 629 - 280 E9/L    MPV 14.0 (H) 7.0 - 12.0 fL    Neutrophils % 68.9 43.0 - 80.0 %    Immature Granulocytes % 0.6 0.0 - 5.0 %    Lymphocytes % 18.4 (L) 20.0 - 42.0 %    Monocytes % 10.6 2.0 - 12.0 %    Eosinophils % 1.1 0.0 - 6.0 %    Basophils % 0.4 0.0 - 2.0 %    Neutrophils Absolute 6.40 1.80 - 7.30 E9/L    Immature Granulocytes # 0.06 E9/L    Lymphocytes Absolute 1.71 1.50 - 4.00 E9/L    Monocytes Absolute 0.99 (H) 0.10 - 0.95 E9/L    Eosinophils Absolute 0. 10 0.05 - 0.50 E9/L    Basophils Absolute 0.04 0.00 - 0.20 E9/L    Vacuolated Neutrophils 1+     Anisocytosis 1+     Polychromasia 1+     Poikilocytes 2+     Sierra Cells 2+     Target Cells 1+     Tear Drop Cells 1+    Strep B screen    Collection Time: 03/12/23  9:00 PM    Specimen: Vaginal; Cervix   Result Value Ref Range    Organism Strep agalactiae (Beta Strep Group B) (A)     Group B Strep Culture       First line therapy of choice is Penicillin. Resistant strains have not been reported to date. If Penicillin allergy exists, please phone laboratory for  susceptibility testing of Group B Beta Strep. Organism will  be maintained in laboratory for 7 days if further testing is  required.      RPR    Collection Time: 03/12/23  9:00 PM   Result Value Ref Range    RPR NON-REACTIVE Non-reactive   Hepatitis C Antibody    Collection Time: 03/12/23  9:00 PM   Result Value Ref Range    Hep C Ab Interp Non-Reactive Non-Reactive   HIV Screen    Collection Time: 03/12/23  9:00 PM   Result Value Ref Range    HIV-1/HIV-2 Ab Non-Reactive Non-Reactive   HIV Rapid 1&2    Collection Time: 03/12/23  9:00 PM   Result Value Ref Range    Rapid HIV 1&2 see below Non-reactive   Hepatitis B Surface Antigen    Collection Time: 03/12/23  9:00 PM   Result Value Ref Range    Hep B S Ag Interp Non-Reactive Non-Reactive   Urinalysis    Collection Time: 03/12/23  9:10 PM   Result Value Ref Range    Color, UA Yellow Straw/Yellow    Clarity, UA Clear Clear    Glucose, Ur Negative Negative mg/dL    Bilirubin Urine Negative Negative    Ketones, Urine 15 (A) Negative mg/dL    Specific Gravity, UA 1.020 1.005 - 1.030    Blood, Urine Negative Negative    pH, UA 6.5 5.0 - 9.0    Protein, UA TRACE Negative mg/dL    Urobilinogen, Urine 1.0 <2.0 E.U./dL    Nitrite, Urine Negative Negative    Leukocyte Esterase, Urine TRACE (A) Negative   Drug screen multi urine    Collection Time: 03/12/23  9:10 PM   Result Value Ref Range    Amphetamine Screen, Urine POSITIVE (A) Negative <1000 ng/mL    Barbiturate Screen, Ur NOT DETECTED Negative < 200 ng/mL    Benzodiazepine Screen, Urine NOT DETECTED Negative < 200 ng/mL    Cannabinoid Scrn, Ur POSITIVE (A) Negative < 50ng/mL    Cocaine Metabolite Screen, Urine NOT DETECTED Negative < 300 ng/mL    Opiate Scrn, Ur NOT DETECTED Negative < 300ng/mL    PCP Screen, Urine NOT DETECTED Negative < 25 ng/mL    Methadone Screen, Urine NOT DETECTED Negative <300 ng/mL    Oxycodone Urine NOT DETECTED Negative <100 ng/mL    FENTANYL SCREEN, URINE NOT DETECTED Negative <1 ng/mL    Drug Screen Comment: see below    Microscopic Urinalysis    Collection Time: 03/12/23  9:10 PM   Result Value Ref Range    WBC, UA 1-3 0 - 5 /HPF    RBC, UA NONE 0 - 2 /HPF    Epithelial Cells, UA FEW /HPF    Bacteria, UA NONE SEEN None Seen /HPF   AMPHETAMINE, Quantitative, Urine Panel    Collection Time: 03/12/23  9:10 PM   Result Value Ref Range    Amphetamine Quant, Ur >1000.0 Cutoff 100 ng/mL    Methamphetamine Quant, Ur >1,000.0 Cutoff 100 ng/mL    EPHEDRINE, QUANTITATIVE, URINE <100.0 Cutoff 100 ng/mL    Phentermine, Urine, Quantitative <100.0 Cutoff 100 ng/mL    MDA, QUANTITATIVE, URINE <100.0 Cutoff 100 ng/mL    MDEA, QUANTITATIVE, URINE <100.0 Cutoff 100 ng/mL    MDMA, QUANTITATIVE, URINE <100.0 Cutoff 100 ng/mL    Comment see below    THC-COOH, Quantitative, Urine    Collection Time: 03/12/23  9:10 PM   Result Value Ref Range    THC-COOH, QUANTITATIVE, URINE 30.6 Cutoff 15 ng/mL    THC Normalized, Quantitative, Urine 9.2 Cutoff 15 ng/mL   Specimen Validity Panel    Collection Time: 03/12/23  9:10 PM   Result Value Ref Range    Integrity Check, Oxidant, Urine <40 < 200  mg/L    Integrity Check, pH, Urine 6.7 4.5 - 9.0    Integrity Check, Specific Gravity, Urine 1.025 1.002 - 1.030    Integrity Check, Creatinine, Urine 331.6 (H) 22 - 250 mg/dL    Integrity Check, Specimen Integrity, Urine see below    Basic metabolic panel    Collection Time: 03/13/23 6:40 AM   Result Value Ref Range    Sodium 134 132 - 146 mmol/L    Potassium 4.6 3.5 - 5.0 mmol/L    Chloride 106 98 - 107 mmol/L    CO2 20 (L) 22 - 29 mmol/L    Anion Gap 8 7 - 16 mmol/L    Glucose 99 74 - 99 mg/dL    BUN 7 6 - 20 mg/dL    Creatinine 0.8 0.5 - 1.0 mg/dL    Est, Glom Filt Rate >60 >=60 mL/min/1.73    Calcium 8.5 (L) 8.6 - 10.2 mg/dL   CBC    Collection Time: 23  6:40 AM   Result Value Ref Range    WBC 11.6 (H) 4.5 - 11.5 E9/L    RBC 3.74 3.50 - 5.50 E12/L    Hemoglobin 10.9 (L) 11.5 - 15.5 g/dL    Hematocrit 33.4 (L) 34.0 - 48.0 %    MCV 89.3 80.0 - 99.9 fL    MCH 29.1 26.0 - 35.0 pg    MCHC 32.6 32.0 - 34.5 %    RDW 13.4 11.5 - 15.0 fL    Platelets 728 251 - 602 E9/L    MPV 13.5 (H) 7.0 - 12.0 fL       A: 34 y.o. female [id number] at 37w11d  POD#3 S/P repeat low transverse  section   Patient Active Problem List   Diagnosis    Depression    Obesity (BMI 30.0-34. 9)    Headache    Vitamin D deficiency    Elevated blood pressure affecting pregnancy in third trimester, antepartum    40 weeks gestation of pregnancy    Uterine contractions during pregnancy    Normal pregnancy, antepartum    39 weeks gestation of pregnancy    History of  delivery    Postpartum care following  delivery    No prenatal care in current pregnancy in third trimester    Previous  section    Previous  delivery affecting pregnancy    Born by  section       P: Routine post-op care. Discharge home with instructions, precautions. Prescriptions for Roxicodone and ibuprofen 600 mg. May continue over-the-counter Simethicone and Colace PRN. Continue PNV with Iron  Follow-up office 1 week for incision check, and 6-8 weeks for final post-op visit.       HARPAL Gutierrez - CNP   3/15/2023 10:55 AM

## 2023-03-27 ENCOUNTER — TELEPHONE (OUTPATIENT)
Dept: OBGYN | Age: 28
End: 2023-03-27

## 2023-03-27 NOTE — TELEPHONE ENCOUNTER
Attempted to contact patient to schedule an incision check, left voicemail message on the number provided.

## 2024-11-27 ENCOUNTER — APPOINTMENT (OUTPATIENT)
Dept: CT IMAGING | Age: 29
End: 2024-11-27
Payer: MEDICAID

## 2024-11-27 ENCOUNTER — HOSPITAL ENCOUNTER (EMERGENCY)
Age: 29
Discharge: HOME | End: 2024-11-27
Attending: STUDENT IN AN ORGANIZED HEALTH CARE EDUCATION/TRAINING PROGRAM
Payer: MEDICAID

## 2024-11-27 VITALS
TEMPERATURE: 99.1 F | OXYGEN SATURATION: 97 % | DIASTOLIC BLOOD PRESSURE: 69 MMHG | RESPIRATION RATE: 16 BRPM | HEART RATE: 98 BPM | BODY MASS INDEX: 27.32 KG/M2 | HEIGHT: 66 IN | SYSTOLIC BLOOD PRESSURE: 110 MMHG | WEIGHT: 170 LBS

## 2024-11-27 DIAGNOSIS — N10 ACUTE PYELONEPHRITIS: Primary | ICD-10-CM

## 2024-11-27 LAB
ALBUMIN SERPL-MCNC: 3.8 G/DL (ref 3.5–5.2)
ALP SERPL-CCNC: 138 U/L (ref 35–104)
ALT SERPL-CCNC: 49 U/L (ref 0–32)
AMORPH SED URNS QL MICRO: PRESENT
ANION GAP SERPL CALCULATED.3IONS-SCNC: 16 MMOL/L (ref 7–16)
AST SERPL-CCNC: 51 U/L (ref 0–31)
ATYPICAL LYMPHOCYTE ABSOLUTE COUNT: 0.1 K/UL (ref 0–0.46)
ATYPICAL LYMPHOCYTES: 1 % (ref 0–4)
BACTERIA URNS QL MICRO: ABNORMAL
BASOPHILS # BLD: 0.1 K/UL (ref 0–0.2)
BASOPHILS NFR BLD: 1 % (ref 0–2)
BILIRUB SERPL-MCNC: 2 MG/DL (ref 0–1.2)
BILIRUB UR QL STRIP: ABNORMAL
BUN SERPL-MCNC: 9 MG/DL (ref 6–20)
CALCIUM SERPL-MCNC: 9.3 MG/DL (ref 8.6–10.2)
CHLORIDE SERPL-SCNC: 94 MMOL/L (ref 98–107)
CLARITY UR: CLEAR
CO2 SERPL-SCNC: 20 MMOL/L (ref 22–29)
COLOR UR: ABNORMAL
CREAT SERPL-MCNC: 0.9 MG/DL (ref 0.5–1)
EOSINOPHIL # BLD: 0 K/UL (ref 0.05–0.5)
EOSINOPHILS RELATIVE PERCENT: 0 % (ref 0–6)
EPI CELLS #/AREA URNS HPF: ABNORMAL /HPF
ERYTHROCYTE [DISTWIDTH] IN BLOOD BY AUTOMATED COUNT: 11.8 % (ref 11.5–15)
GFR, ESTIMATED: >90 ML/MIN/1.73M2
GLUCOSE SERPL-MCNC: 173 MG/DL (ref 74–99)
GLUCOSE UR STRIP-MCNC: NEGATIVE MG/DL
HCG, URINE, POC: NEGATIVE
HCT VFR BLD AUTO: 39.2 % (ref 34–48)
HGB BLD-MCNC: 12.6 G/DL (ref 11.5–15.5)
HGB UR QL STRIP.AUTO: ABNORMAL
KETONES UR STRIP-MCNC: ABNORMAL MG/DL
LACTATE BLDV-SCNC: 1.9 MMOL/L (ref 0.5–2.2)
LEUKOCYTE ESTERASE UR QL STRIP: ABNORMAL
LIPASE SERPL-CCNC: 21 U/L (ref 13–60)
LYMPHOCYTES NFR BLD: 3.21 K/UL (ref 1.5–4)
LYMPHOCYTES RELATIVE PERCENT: 29 % (ref 20–42)
Lab: NORMAL
MAGNESIUM SERPL-MCNC: 2.2 MG/DL (ref 1.6–2.6)
MCH RBC QN AUTO: 28.7 PG (ref 26–35)
MCHC RBC AUTO-ENTMCNC: 32.1 G/DL (ref 32–34.5)
MCV RBC AUTO: 89.3 FL (ref 80–99.9)
MONOCYTES NFR BLD: 1.17 K/UL (ref 0.1–0.95)
MONOCYTES NFR BLD: 10 % (ref 2–12)
MUCOUS THREADS URNS QL MICRO: PRESENT
NEGATIVE QC PASS/FAIL: NORMAL
NEUTROPHILS NFR BLD: 59 % (ref 43–80)
NEUTS SEG NFR BLD: 6.62 K/UL (ref 1.8–7.3)
NITRITE UR QL STRIP: POSITIVE
PH UR STRIP: 6 [PH] (ref 5–9)
PLATELET # BLD AUTO: 321 K/UL (ref 130–450)
PMV BLD AUTO: 11.7 FL (ref 7–12)
POSITIVE QC PASS/FAIL: NORMAL
POTASSIUM SERPL-SCNC: 3.4 MMOL/L (ref 3.5–5)
PROT SERPL-MCNC: 7.9 G/DL (ref 6.4–8.3)
PROT UR STRIP-MCNC: 100 MG/DL
RBC # BLD AUTO: 4.39 M/UL (ref 3.5–5.5)
RBC # BLD: ABNORMAL 10*6/UL
RBC #/AREA URNS HPF: ABNORMAL /HPF
SODIUM SERPL-SCNC: 130 MMOL/L (ref 132–146)
SP GR UR STRIP: 1.02 (ref 1–1.03)
UROBILINOGEN UR STRIP-ACNC: >8 EU/DL (ref 0–1)
WBC #/AREA URNS HPF: ABNORMAL /HPF
WBC OTHER # BLD: 11.2 K/UL (ref 4.5–11.5)

## 2024-11-27 PROCEDURE — 83605 ASSAY OF LACTIC ACID: CPT

## 2024-11-27 PROCEDURE — 6360000002 HC RX W HCPCS

## 2024-11-27 PROCEDURE — 96374 THER/PROPH/DIAG INJ IV PUSH: CPT

## 2024-11-27 PROCEDURE — 83735 ASSAY OF MAGNESIUM: CPT

## 2024-11-27 PROCEDURE — 80053 COMPREHEN METABOLIC PANEL: CPT

## 2024-11-27 PROCEDURE — 2580000003 HC RX 258

## 2024-11-27 PROCEDURE — 85025 COMPLETE CBC W/AUTO DIFF WBC: CPT

## 2024-11-27 PROCEDURE — 96375 TX/PRO/DX INJ NEW DRUG ADDON: CPT

## 2024-11-27 PROCEDURE — 6360000004 HC RX CONTRAST MEDICATION: Performed by: RADIOLOGY

## 2024-11-27 PROCEDURE — 74177 CT ABD & PELVIS W/CONTRAST: CPT

## 2024-11-27 PROCEDURE — 83690 ASSAY OF LIPASE: CPT

## 2024-11-27 PROCEDURE — 81001 URINALYSIS AUTO W/SCOPE: CPT

## 2024-11-27 PROCEDURE — 99285 EMERGENCY DEPT VISIT HI MDM: CPT

## 2024-11-27 RX ORDER — ONDANSETRON 4 MG/1
4 TABLET, FILM COATED ORAL EVERY 8 HOURS PRN
Qty: 20 TABLET | Refills: 0 | Status: SHIPPED | OUTPATIENT
Start: 2024-11-27

## 2024-11-27 RX ORDER — ONDANSETRON 2 MG/ML
4 INJECTION INTRAMUSCULAR; INTRAVENOUS ONCE
Status: COMPLETED | OUTPATIENT
Start: 2024-11-27 | End: 2024-11-27

## 2024-11-27 RX ORDER — IOPAMIDOL 755 MG/ML
75 INJECTION, SOLUTION INTRAVASCULAR
Status: COMPLETED | OUTPATIENT
Start: 2024-11-27 | End: 2024-11-27

## 2024-11-27 RX ORDER — 0.9 % SODIUM CHLORIDE 0.9 %
1000 INTRAVENOUS SOLUTION INTRAVENOUS ONCE
Status: COMPLETED | OUTPATIENT
Start: 2024-11-27 | End: 2024-11-27

## 2024-11-27 RX ORDER — CEFDINIR 300 MG/1
300 CAPSULE ORAL 2 TIMES DAILY
Qty: 28 CAPSULE | Refills: 0 | Status: SHIPPED | OUTPATIENT
Start: 2024-11-27 | End: 2024-12-11

## 2024-11-27 RX ADMIN — SODIUM CHLORIDE 1000 ML: 9 INJECTION, SOLUTION INTRAVENOUS at 21:00

## 2024-11-27 RX ADMIN — IOPAMIDOL 75 ML: 755 INJECTION, SOLUTION INTRAVENOUS at 21:29

## 2024-11-27 RX ADMIN — CEFTRIAXONE SODIUM 2000 MG: 2 INJECTION, POWDER, FOR SOLUTION INTRAMUSCULAR; INTRAVENOUS at 21:15

## 2024-11-27 RX ADMIN — ONDANSETRON 4 MG: 2 INJECTION, SOLUTION INTRAMUSCULAR; INTRAVENOUS at 20:58

## 2024-11-27 ASSESSMENT — PAIN DESCRIPTION - PAIN TYPE: TYPE: ACUTE PAIN

## 2024-11-27 ASSESSMENT — PAIN - FUNCTIONAL ASSESSMENT
PAIN_FUNCTIONAL_ASSESSMENT: 0-10
PAIN_FUNCTIONAL_ASSESSMENT: ACTIVITIES ARE NOT PREVENTED
PAIN_FUNCTIONAL_ASSESSMENT: 0-10

## 2024-11-27 ASSESSMENT — PAIN DESCRIPTION - LOCATION
LOCATION: ABDOMEN
LOCATION: ABDOMEN;BACK

## 2024-11-27 ASSESSMENT — LIFESTYLE VARIABLES
HOW OFTEN DO YOU HAVE A DRINK CONTAINING ALCOHOL: NEVER
HOW MANY STANDARD DRINKS CONTAINING ALCOHOL DO YOU HAVE ON A TYPICAL DAY: PATIENT DOES NOT DRINK

## 2024-11-27 ASSESSMENT — PAIN SCALES - GENERAL
PAINLEVEL_OUTOF10: 8
PAINLEVEL_OUTOF10: 5

## 2024-11-28 ASSESSMENT — ENCOUNTER SYMPTOMS
PHOTOPHOBIA: 0
CHEST TIGHTNESS: 0
RHINORRHEA: 0
VOMITING: 1
DIARRHEA: 0
SHORTNESS OF BREATH: 0
ABDOMINAL PAIN: 1
COUGH: 0
NAUSEA: 1
SORE THROAT: 0
BACK PAIN: 0

## 2024-11-28 NOTE — ED PROVIDER NOTES
[facility] EMERGENCY DEPARTMENT  EMERGENCY DEPARTMENT ENCOUNTER        Pt Name: Asa Nava  MRN: [id number]  Birthdate 1995  Date of evaluation: 11/27/2024  Provider: Clement Moore DO  PCP: Pan Foster DO  Note Started: 5:50 AM EST 11/28/24    CHIEF COMPLAINT       Chief Complaint   Patient presents with    Abdominal Pain     Lower abd pain radiating into lower back x1 week    Vomiting    Constipation       HISTORY OF PRESENT ILLNESS: 1 or more Elements   History From: Patient      Asa Nava is a 29 y.o. female who presents the emergency department for abdominal pain with 1 week.  Patient states that she has also had nausea and vomiting starting today.  Patient denies any ill contacts.  Patient states she stopped smoking marijuana.  Patient denies any chest pain or shortness of breath or cough.  Patient denies any dysuria or pain with urination.  Patient denies any known fevers or chills.  Patient denies any periumbilical abdominal pain.  Patient states the pain is inferior to the bellybutton.  Patient denies any trauma to the area.  Patient denies any diarrhea.  Patient states her last bowel movement was 2 days ago.    Review of Systems   Constitutional:  Negative for appetite change, chills, fatigue and fever.   HENT:  Negative for congestion, rhinorrhea and sore throat.    Eyes:  Negative for photophobia and visual disturbance.   Respiratory:  Negative for cough, chest tightness and shortness of breath.    Cardiovascular:  Negative for chest pain and palpitations.   Gastrointestinal:  Positive for abdominal pain, nausea and vomiting. Negative for diarrhea.   Endocrine: Negative for polydipsia and polyuria.   Genitourinary:  Negative for dysuria.   Musculoskeletal:  Negative for back pain and neck pain.   Skin:  Negative for rash.   Neurological:  Negative for dizziness and headaches.         Nursing Notes were all reviewed and agreed with or any disagreements

## (undated) DEVICE — APPLICATOR PREP 26ML 0.7% IOD POVACRYLEX 74% ISO ALC ST

## (undated) DEVICE — SUTURE VCRL + SZ 0 L36IN ABSRB VLT L36MM CT-1 1/2 CIR VCP346H

## (undated) DEVICE — STRIP,CLOSURE,WOUND,MEDI-STRIP,1/2X4: Brand: MEDLINE

## (undated) DEVICE — SPONGE LAP W18XL18IN WHT COT 4 PLY FLD STRUNG RADPQ DISP ST

## (undated) DEVICE — TOWEL,OR,DSP,ST,BLUE,STD,6/PK,12PK/CS: Brand: MEDLINE

## (undated) DEVICE — SHEET,DRAPE,53X77,STERILE: Brand: MEDLINE

## (undated) DEVICE — GOWN,SIRUS,FABRNF,L,20/CS: Brand: MEDLINE

## (undated) DEVICE — TUBING, SUCTION, 3/16" X 12', STRAIGHT: Brand: MEDLINE

## (undated) DEVICE — ELECTRODE PT RET AD L9FT HI MOIST COND ADH HYDRGEL CORDED

## (undated) DEVICE — COUNTER NDL 30 COUNT DBL MAG

## (undated) DEVICE — CONTAINER,SPEC,PNEUM TUBE,3OZ,STRL PATH: Brand: MEDLINE

## (undated) DEVICE — CONTAINER SPEC 64OZ POLYPR PATH SNAP LOK CAP W/ LID

## (undated) DEVICE — GLOVE SURG SZ 75 L12IN FNGR THK83MIL CRM POLYISOPRENE

## (undated) DEVICE — PEN: MARKING STD 100/CS: Brand: MEDICAL ACTION INDUSTRIES

## (undated) DEVICE — CATHETERIZATION KIT FOL16 FR 2000 CC DRAINAGE BG LUBRICATH

## (undated) DEVICE — SUTURE VIC + BR VIO CT1 2-0 36IN VCP345H

## (undated) DEVICE — CESAREAN BIRTH PACK II: Brand: MEDLINE INDUSTRIES, INC.

## (undated) DEVICE — PENCIL ES L3M BTTN SWCH HOLSTER W/ BLDE ELECTRD EDGE

## (undated) DEVICE — 3M™ STERI-STRIP™ COMPOUND BENZOIN TINCTURE 40 BAGS/CARTON 4 CARTONS/CASE C1544: Brand: 3M™ STERI-STRIP™

## (undated) DEVICE — HYPODERMIC SAFETY NEEDLE: Brand: MAGELLAN

## (undated) DEVICE — TELFA ADHESIVE ISLAND DRESSING: Brand: TELFA

## (undated) DEVICE — Device: Brand: PORTEX

## (undated) DEVICE — TUBE BLD COLLECT ST 1 SIL COAT 7ML 10ML

## (undated) DEVICE — 3000CC GUARDIAN II: Brand: GUARDIAN

## (undated) DEVICE — STAPLER SKIN SQ 30 ABSRB STPL DISP INSORB

## (undated) DEVICE — COVER,LIGHT HANDLE,FLX,2/PK: Brand: MEDLINE INDUSTRIES, INC.

## (undated) DEVICE — SUTURE STRATAFIX SPRL SZ 1 L14IN ABSRB VLT L48CM CTX 1/2 SXPD2B405

## (undated) DEVICE — SUTURE VCRL + SZ 3-0 L36IN ABSRB UD L36MM CT-1 1/2 CIR VCP944H

## (undated) DEVICE — MEDI-VAC YANKAUER SUCTION HANDLE W/BULBOUS TIP: Brand: CARDINAL HEALTH

## (undated) DEVICE — GLOVE SURG SZ 65 L12IN FNGR THK83MIL CRM POLYISOPRENE

## (undated) DEVICE — GLOVE SURG SZ 65 THK91MIL LTX FREE SYN POLYISOPRENE

## (undated) DEVICE — AGENT HEMSTAT 3GM PURIFIED PLNT STARCH PWD ABSRB ARISTA AH

## (undated) DEVICE — STAPLER SKIN SQ 30 ABSRB STPL DISP INSORB ORDER VIA PHONE OR EMAIL

## (undated) DEVICE — SKIN AFFIX SURG ADHESIVE 72/CS 0.55ML: Brand: MEDLINE

## (undated) DEVICE — GOWN,SIRUS,POLYRNF,BRTHSLV,XLN/XL,20/CS: Brand: MEDLINE

## (undated) DEVICE — CESAREAN BIRTH PACK: Brand: MEDLINE INDUSTRIES, INC.

## (undated) DEVICE — BLADE SURG NO20 S STL STR DISP GLASSVAN

## (undated) DEVICE — GOWN,SIRUS,POLYRNF,BRTHSLV,XLN/XXL,18/CS: Brand: MEDLINE

## (undated) DEVICE — WOUND RETRACTOR AND PROTECTOR: Brand: ALEXIS WOUND PROTECTOR-RETRACTOR